# Patient Record
Sex: MALE | Race: WHITE | Employment: OTHER | URBAN - METROPOLITAN AREA
[De-identification: names, ages, dates, MRNs, and addresses within clinical notes are randomized per-mention and may not be internally consistent; named-entity substitution may affect disease eponyms.]

---

## 2023-01-03 ENCOUNTER — APPOINTMENT (OUTPATIENT)
Dept: GENERAL RADIOLOGY | Age: 73
End: 2023-01-03
Payer: MEDICARE

## 2023-01-03 ENCOUNTER — HOSPITAL ENCOUNTER (INPATIENT)
Age: 73
LOS: 3 days | Discharge: HOME | End: 2023-01-06
Attending: HOSPITALIST | Admitting: HOSPITALIST
Payer: MEDICARE

## 2023-01-03 DIAGNOSIS — J44.1 COPD EXACERBATION (HCC): ICD-10-CM

## 2023-01-03 DIAGNOSIS — R09.02 HYPOXEMIA REQUIRING SUPPLEMENTAL OXYGEN: ICD-10-CM

## 2023-01-03 DIAGNOSIS — J12.82 PNEUMONIA DUE TO COVID-19 VIRUS: Primary | ICD-10-CM

## 2023-01-03 DIAGNOSIS — Z99.81 HYPOXEMIA REQUIRING SUPPLEMENTAL OXYGEN: ICD-10-CM

## 2023-01-03 DIAGNOSIS — U07.1 PNEUMONIA DUE TO COVID-19 VIRUS: Primary | ICD-10-CM

## 2023-01-03 DIAGNOSIS — E83.42 HYPOMAGNESEMIA: ICD-10-CM

## 2023-01-03 PROBLEM — Z86.73 HISTORY OF CVA IN ADULTHOOD: Status: ACTIVE | Noted: 2023-01-03

## 2023-01-03 PROBLEM — I50.22 CHRONIC SYSTOLIC CHF (CONGESTIVE HEART FAILURE) (HCC): Status: ACTIVE | Noted: 2023-01-03

## 2023-01-03 PROBLEM — E78.5 HLD (HYPERLIPIDEMIA): Status: ACTIVE | Noted: 2023-01-03

## 2023-01-03 PROBLEM — J96.01 ACUTE HYPOXEMIC RESPIRATORY FAILURE DUE TO COVID-19 (HCC): Status: ACTIVE | Noted: 2023-01-03

## 2023-01-03 PROBLEM — J44.9 COPD (CHRONIC OBSTRUCTIVE PULMONARY DISEASE) (HCC): Status: ACTIVE | Noted: 2023-01-03

## 2023-01-03 LAB
A/G RATIO: 0.8 (ref 1.1–2.2)
ALBUMIN SERPL-MCNC: 3.1 G/DL (ref 3.4–5)
ALP BLD-CCNC: 189 U/L (ref 40–129)
ALT SERPL-CCNC: 30 U/L (ref 10–40)
ANION GAP SERPL CALCULATED.3IONS-SCNC: 10 MMOL/L (ref 3–16)
APTT: 33.9 SEC (ref 23–34.3)
AST SERPL-CCNC: 36 U/L (ref 15–37)
BASOPHILS ABSOLUTE: 0.1 K/UL (ref 0–0.2)
BASOPHILS RELATIVE PERCENT: 1.1 %
BILIRUB SERPL-MCNC: 0.5 MG/DL (ref 0–1)
BUN BLDV-MCNC: 13 MG/DL (ref 7–20)
CALCIUM SERPL-MCNC: 9.4 MG/DL (ref 8.3–10.6)
CHLORIDE BLD-SCNC: 91 MMOL/L (ref 99–110)
CO2: 32 MMOL/L (ref 21–32)
CREAT SERPL-MCNC: 0.7 MG/DL (ref 0.8–1.3)
EKG ATRIAL RATE: 100 BPM
EKG DIAGNOSIS: NORMAL
EKG P AXIS: 69 DEGREES
EKG P-R INTERVAL: 124 MS
EKG Q-T INTERVAL: 330 MS
EKG QRS DURATION: 102 MS
EKG QTC CALCULATION (BAZETT): 425 MS
EKG R AXIS: 74 DEGREES
EKG T AXIS: 47 DEGREES
EKG VENTRICULAR RATE: 100 BPM
EOSINOPHILS ABSOLUTE: 0.1 K/UL (ref 0–0.6)
EOSINOPHILS RELATIVE PERCENT: 0.6 %
FIBRINOGEN: 998 MG/DL (ref 207–509)
GFR SERPL CREATININE-BSD FRML MDRD: >60 ML/MIN/{1.73_M2}
GLUCOSE BLD-MCNC: 133 MG/DL (ref 70–99)
HCT VFR BLD CALC: 36.7 % (ref 40.5–52.5)
HEMOGLOBIN: 12.1 G/DL (ref 13.5–17.5)
INFLUENZA A: NOT DETECTED
INFLUENZA B: NOT DETECTED
INR BLD: 1.1 (ref 0.87–1.14)
LACTATE DEHYDROGENASE: 214 U/L (ref 100–190)
LACTIC ACID, SEPSIS: 1.5 MMOL/L (ref 0.4–1.9)
LYMPHOCYTES ABSOLUTE: 0.5 K/UL (ref 1–5.1)
LYMPHOCYTES RELATIVE PERCENT: 4.3 %
MAGNESIUM: 1.6 MG/DL (ref 1.8–2.4)
MCH RBC QN AUTO: 29.2 PG (ref 26–34)
MCHC RBC AUTO-ENTMCNC: 33.1 G/DL (ref 31–36)
MCV RBC AUTO: 88.1 FL (ref 80–100)
MONOCYTES ABSOLUTE: 1 K/UL (ref 0–1.3)
MONOCYTES RELATIVE PERCENT: 9.5 %
NEUTROPHILS ABSOLUTE: 9.1 K/UL (ref 1.7–7.7)
NEUTROPHILS RELATIVE PERCENT: 84.5 %
PDW BLD-RTO: 15.4 % (ref 12.4–15.4)
PLATELET # BLD: 353 K/UL (ref 135–450)
PMV BLD AUTO: 7.7 FL (ref 5–10.5)
POTASSIUM REFLEX MAGNESIUM: 3.4 MMOL/L (ref 3.5–5.1)
PRO-BNP: 170 PG/ML (ref 0–124)
PROCALCITONIN: 0.15 NG/ML (ref 0–0.15)
PROTHROMBIN TIME: 14.1 SEC (ref 11.7–14.5)
RBC # BLD: 4.16 M/UL (ref 4.2–5.9)
SARS-COV-2 RNA, RT PCR: DETECTED
SODIUM BLD-SCNC: 133 MMOL/L (ref 136–145)
TOTAL PROTEIN: 7 G/DL (ref 6.4–8.2)
TROPONIN: <0.01 NG/ML
TROPONIN: <0.01 NG/ML
WBC # BLD: 10.7 K/UL (ref 4–11)

## 2023-01-03 PROCEDURE — 6360000002 HC RX W HCPCS: Performed by: NURSE PRACTITIONER

## 2023-01-03 PROCEDURE — 71045 X-RAY EXAM CHEST 1 VIEW: CPT

## 2023-01-03 PROCEDURE — 96375 TX/PRO/DX INJ NEW DRUG ADDON: CPT

## 2023-01-03 PROCEDURE — 85384 FIBRINOGEN ACTIVITY: CPT

## 2023-01-03 PROCEDURE — 94640 AIRWAY INHALATION TREATMENT: CPT

## 2023-01-03 PROCEDURE — 1200000000 HC SEMI PRIVATE

## 2023-01-03 PROCEDURE — 84145 PROCALCITONIN (PCT): CPT

## 2023-01-03 PROCEDURE — 87636 SARSCOV2 & INF A&B AMP PRB: CPT

## 2023-01-03 PROCEDURE — 6370000000 HC RX 637 (ALT 250 FOR IP): Performed by: HOSPITALIST

## 2023-01-03 PROCEDURE — 87070 CULTURE OTHR SPECIMN AEROBIC: CPT

## 2023-01-03 PROCEDURE — 93005 ELECTROCARDIOGRAM TRACING: CPT | Performed by: STUDENT IN AN ORGANIZED HEALTH CARE EDUCATION/TRAINING PROGRAM

## 2023-01-03 PROCEDURE — 6370000000 HC RX 637 (ALT 250 FOR IP): Performed by: NURSE PRACTITIONER

## 2023-01-03 PROCEDURE — 96365 THER/PROPH/DIAG IV INF INIT: CPT

## 2023-01-03 PROCEDURE — 84484 ASSAY OF TROPONIN QUANT: CPT

## 2023-01-03 PROCEDURE — 87040 BLOOD CULTURE FOR BACTERIA: CPT

## 2023-01-03 PROCEDURE — 99285 EMERGENCY DEPT VISIT HI MDM: CPT

## 2023-01-03 PROCEDURE — 83930 ASSAY OF BLOOD OSMOLALITY: CPT

## 2023-01-03 PROCEDURE — 83615 LACTATE (LD) (LDH) ENZYME: CPT

## 2023-01-03 PROCEDURE — 83735 ASSAY OF MAGNESIUM: CPT

## 2023-01-03 PROCEDURE — 94761 N-INVAS EAR/PLS OXIMETRY MLT: CPT

## 2023-01-03 PROCEDURE — 85730 THROMBOPLASTIN TIME PARTIAL: CPT

## 2023-01-03 PROCEDURE — 93010 ELECTROCARDIOGRAM REPORT: CPT | Performed by: INTERNAL MEDICINE

## 2023-01-03 PROCEDURE — 2700000000 HC OXYGEN THERAPY PER DAY

## 2023-01-03 PROCEDURE — 82306 VITAMIN D 25 HYDROXY: CPT

## 2023-01-03 PROCEDURE — 80053 COMPREHEN METABOLIC PANEL: CPT

## 2023-01-03 PROCEDURE — 85610 PROTHROMBIN TIME: CPT

## 2023-01-03 PROCEDURE — 83605 ASSAY OF LACTIC ACID: CPT

## 2023-01-03 PROCEDURE — 85025 COMPLETE CBC W/AUTO DIFF WBC: CPT

## 2023-01-03 PROCEDURE — 87205 SMEAR GRAM STAIN: CPT

## 2023-01-03 PROCEDURE — 83880 ASSAY OF NATRIURETIC PEPTIDE: CPT

## 2023-01-03 RX ORDER — SODIUM CHLORIDE 9 MG/ML
INJECTION, SOLUTION INTRAVENOUS CONTINUOUS
Status: ACTIVE | OUTPATIENT
Start: 2023-01-03 | End: 2023-01-04

## 2023-01-03 RX ORDER — MAGNESIUM SULFATE 1 G/100ML
1000 INJECTION INTRAVENOUS ONCE
Status: COMPLETED | OUTPATIENT
Start: 2023-01-03 | End: 2023-01-03

## 2023-01-03 RX ORDER — UMECLIDINIUM 62.5 UG/1
AEROSOL, POWDER ORAL
COMMUNITY
Start: 2022-12-14

## 2023-01-03 RX ORDER — METHYLPREDNISOLONE SODIUM SUCCINATE 40 MG/ML
40 INJECTION, POWDER, LYOPHILIZED, FOR SOLUTION INTRAMUSCULAR; INTRAVENOUS EVERY 12 HOURS
Status: DISCONTINUED | OUTPATIENT
Start: 2023-01-04 | End: 2023-01-05

## 2023-01-03 RX ORDER — FLUTICASONE PROPIONATE AND SALMETEROL 50; 500 UG/1; UG/1
POWDER RESPIRATORY (INHALATION)
COMMUNITY
Start: 2023-01-02

## 2023-01-03 RX ORDER — ACETAMINOPHEN 650 MG/1
650 SUPPOSITORY RECTAL EVERY 6 HOURS PRN
Status: DISCONTINUED | OUTPATIENT
Start: 2023-01-03 | End: 2023-01-06 | Stop reason: HOSPADM

## 2023-01-03 RX ORDER — GUAIFENESIN/DEXTROMETHORPHAN 100-10MG/5
5 SYRUP ORAL EVERY 4 HOURS PRN
Status: DISCONTINUED | OUTPATIENT
Start: 2023-01-03 | End: 2023-01-06 | Stop reason: HOSPADM

## 2023-01-03 RX ORDER — METOPROLOL SUCCINATE 25 MG/1
TABLET, EXTENDED RELEASE ORAL
COMMUNITY
Start: 2022-12-24

## 2023-01-03 RX ORDER — ENOXAPARIN SODIUM 100 MG/ML
30 INJECTION SUBCUTANEOUS 2 TIMES DAILY
Status: DISCONTINUED | OUTPATIENT
Start: 2023-01-04 | End: 2023-01-06 | Stop reason: HOSPADM

## 2023-01-03 RX ORDER — SODIUM CHLORIDE 0.9 % (FLUSH) 0.9 %
10 SYRINGE (ML) INJECTION PRN
Status: DISCONTINUED | OUTPATIENT
Start: 2023-01-03 | End: 2023-01-06 | Stop reason: HOSPADM

## 2023-01-03 RX ORDER — ONDANSETRON 2 MG/ML
4 INJECTION INTRAMUSCULAR; INTRAVENOUS EVERY 6 HOURS PRN
Status: DISCONTINUED | OUTPATIENT
Start: 2023-01-03 | End: 2023-01-06 | Stop reason: HOSPADM

## 2023-01-03 RX ORDER — IPRATROPIUM BROMIDE AND ALBUTEROL SULFATE 2.5; .5 MG/3ML; MG/3ML
1 SOLUTION RESPIRATORY (INHALATION) ONCE
Status: COMPLETED | OUTPATIENT
Start: 2023-01-03 | End: 2023-01-03

## 2023-01-03 RX ORDER — MAGNESIUM SULFATE IN WATER 40 MG/ML
2000 INJECTION, SOLUTION INTRAVENOUS PRN
Status: DISCONTINUED | OUTPATIENT
Start: 2023-01-03 | End: 2023-01-06 | Stop reason: HOSPADM

## 2023-01-03 RX ORDER — METHYLPREDNISOLONE SODIUM SUCCINATE 125 MG/2ML
80 INJECTION, POWDER, LYOPHILIZED, FOR SOLUTION INTRAMUSCULAR; INTRAVENOUS ONCE
Status: COMPLETED | OUTPATIENT
Start: 2023-01-03 | End: 2023-01-03

## 2023-01-03 RX ORDER — ACETAMINOPHEN 325 MG/1
650 TABLET ORAL EVERY 6 HOURS PRN
Status: DISCONTINUED | OUTPATIENT
Start: 2023-01-03 | End: 2023-01-04 | Stop reason: SDUPTHER

## 2023-01-03 RX ORDER — OMEPRAZOLE 20 MG/1
CAPSULE, DELAYED RELEASE ORAL
COMMUNITY
Start: 2022-12-21

## 2023-01-03 RX ORDER — SPIRONOLACTONE 25 MG/1
TABLET ORAL
COMMUNITY
Start: 2022-12-24

## 2023-01-03 RX ORDER — DIGOXIN 125 MCG
125 TABLET ORAL DAILY
Status: DISCONTINUED | OUTPATIENT
Start: 2023-01-03 | End: 2023-01-06 | Stop reason: HOSPADM

## 2023-01-03 RX ORDER — ACETAMINOPHEN 325 MG/1
650 TABLET ORAL EVERY 6 HOURS PRN
Status: DISCONTINUED | OUTPATIENT
Start: 2023-01-03 | End: 2023-01-06 | Stop reason: HOSPADM

## 2023-01-03 RX ORDER — METOPROLOL SUCCINATE 25 MG/1
12.5 TABLET, EXTENDED RELEASE ORAL DAILY
Status: DISCONTINUED | OUTPATIENT
Start: 2023-01-04 | End: 2023-01-06 | Stop reason: HOSPADM

## 2023-01-03 RX ORDER — ATORVASTATIN CALCIUM 80 MG/1
TABLET, FILM COATED ORAL
COMMUNITY
Start: 2022-12-19

## 2023-01-03 RX ORDER — ONDANSETRON 4 MG/1
4 TABLET, ORALLY DISINTEGRATING ORAL EVERY 8 HOURS PRN
Status: DISCONTINUED | OUTPATIENT
Start: 2023-01-03 | End: 2023-01-06 | Stop reason: HOSPADM

## 2023-01-03 RX ORDER — ATORVASTATIN CALCIUM 40 MG/1
40 TABLET, FILM COATED ORAL NIGHTLY
Status: DISCONTINUED | OUTPATIENT
Start: 2023-01-03 | End: 2023-01-06 | Stop reason: HOSPADM

## 2023-01-03 RX ORDER — PANTOPRAZOLE SODIUM 40 MG/1
40 TABLET, DELAYED RELEASE ORAL
Status: DISCONTINUED | OUTPATIENT
Start: 2023-01-04 | End: 2023-01-06 | Stop reason: HOSPADM

## 2023-01-03 RX ORDER — DIGOXIN 125 MCG
TABLET ORAL
COMMUNITY
Start: 2022-12-19

## 2023-01-03 RX ORDER — ACETAMINOPHEN 650 MG/1
650 SUPPOSITORY RECTAL EVERY 6 HOURS PRN
Status: DISCONTINUED | OUTPATIENT
Start: 2023-01-03 | End: 2023-01-04 | Stop reason: SDUPTHER

## 2023-01-03 RX ORDER — TORSEMIDE 20 MG/1
TABLET ORAL
COMMUNITY
Start: 2022-12-19

## 2023-01-03 RX ORDER — POTASSIUM CHLORIDE 20 MEQ/1
20 TABLET, EXTENDED RELEASE ORAL ONCE
Status: COMPLETED | OUTPATIENT
Start: 2023-01-03 | End: 2023-01-04

## 2023-01-03 RX ORDER — POTASSIUM CHLORIDE 7.45 MG/ML
10 INJECTION INTRAVENOUS PRN
Status: DISCONTINUED | OUTPATIENT
Start: 2023-01-03 | End: 2023-01-06 | Stop reason: HOSPADM

## 2023-01-03 RX ORDER — SPIRONOLACTONE 25 MG/1
12.5 TABLET ORAL DAILY
Status: DISCONTINUED | OUTPATIENT
Start: 2023-01-04 | End: 2023-01-06 | Stop reason: HOSPADM

## 2023-01-03 RX ORDER — SODIUM CHLORIDE 9 MG/ML
INJECTION, SOLUTION INTRAVENOUS PRN
Status: DISCONTINUED | OUTPATIENT
Start: 2023-01-03 | End: 2023-01-06 | Stop reason: HOSPADM

## 2023-01-03 RX ORDER — SENNA PLUS 8.6 MG/1
1 TABLET ORAL DAILY PRN
Status: DISCONTINUED | OUTPATIENT
Start: 2023-01-03 | End: 2023-01-06 | Stop reason: HOSPADM

## 2023-01-03 RX ORDER — SODIUM CHLORIDE 0.9 % (FLUSH) 0.9 %
10 SYRINGE (ML) INJECTION EVERY 12 HOURS SCHEDULED
Status: DISCONTINUED | OUTPATIENT
Start: 2023-01-03 | End: 2023-01-06 | Stop reason: HOSPADM

## 2023-01-03 RX ORDER — MAGNESIUM SULFATE 1 G/100ML
1000 INJECTION INTRAVENOUS ONCE
Status: COMPLETED | OUTPATIENT
Start: 2023-01-03 | End: 2023-01-04

## 2023-01-03 RX ORDER — POTASSIUM CHLORIDE 20 MEQ/1
40 TABLET, EXTENDED RELEASE ORAL PRN
Status: DISCONTINUED | OUTPATIENT
Start: 2023-01-03 | End: 2023-01-06 | Stop reason: HOSPADM

## 2023-01-03 RX ORDER — TORSEMIDE 20 MG/1
20 TABLET ORAL DAILY
Status: DISCONTINUED | OUTPATIENT
Start: 2023-01-04 | End: 2023-01-06 | Stop reason: HOSPADM

## 2023-01-03 RX ADMIN — IPRATROPIUM BROMIDE AND ALBUTEROL SULFATE 1 AMPULE: .5; 2.5 SOLUTION RESPIRATORY (INHALATION) at 11:06

## 2023-01-03 RX ADMIN — DIGOXIN 125 MCG: 125 TABLET ORAL at 21:20

## 2023-01-03 RX ADMIN — MAGNESIUM SULFATE HEPTAHYDRATE 1000 MG: 1 INJECTION, SOLUTION INTRAVENOUS at 15:01

## 2023-01-03 RX ADMIN — Medication 2 PUFF: at 21:46

## 2023-01-03 RX ADMIN — ATORVASTATIN CALCIUM 40 MG: 40 TABLET, FILM COATED ORAL at 21:20

## 2023-01-03 RX ADMIN — METHYLPREDNISOLONE SODIUM SUCCINATE 80 MG: 125 INJECTION, POWDER, FOR SOLUTION INTRAMUSCULAR; INTRAVENOUS at 10:27

## 2023-01-03 ASSESSMENT — ENCOUNTER SYMPTOMS
SHORTNESS OF BREATH: 1
WHEEZING: 0
COLOR CHANGE: 0
ABDOMINAL PAIN: 0
COUGH: 0
NAUSEA: 0
VOMITING: 0
BACK PAIN: 0
DIARRHEA: 0

## 2023-01-03 NOTE — ED PROVIDER NOTES
**ADVANCED PRACTICE PROVIDER, I HAVE EVALUATED THIS [facility]  ED  EMERGENCY DEPARTMENT ENCOUNTER      Pt Name: Aarti Lynch  DSU:[id number]  [place] 1950  Date of evaluation: 1/3/2023  Provider: ISRAEL Dean CNP  Note Started: 10:03 AM EST 1/3/2023        Chief Complaint:    Chief Complaint   Patient presents with    Shortness of Breath     Started yesterday, hx of copd, 86% on RA, 91% on 2L NC         Nursing Notes, Past Medical Hx, Past Surgical Hx, Social Hx, Allergies, and Family Hx were all reviewed and agreed with or any disagreements were addressed in the HPI.    HPI: (Location, Duration, Timing, Severity, Quality, Assoc Sx, Context, Modifying factors)    History From: patient      Chief Complaint of shortness of breath on O2 at assisted living    This is a  81 y.o. male who presents  today with SOB, but started with a fever yesterday with tmax of 101, he states that he lives at [facility], has history of COPD and wears O2 at 3lpm/nc normally, he states that when he went to the bathroom earlier he was so SOB he felt like he was going to pass out, so he checked his pulse ox (as he wears an oximetry on a necklace) and it was 78-82% with his o2 in place. He denies any chest pain, pleuritic chest pain associated with his SOB. He states that he has had wet cough since yesterday, able to have a little bit of phlegm that is a greenish color. He is not a smoker. He is not have any pain on exam, no nausea vomiting or diarrhea. No headache neck pain neck stiffness, no pain on exam, no additional complaints, no additional aggravating relieving factors. Patient presents awake, alert and in no acute respiratory distress or toxic appearance.     PastMedical/Surgical History:      Diagnosis Date    Cerebral artery occlusion with cerebral infarction [facility])     COPD (chronic obstructive pulmonary disease) (HCC)     Hyperlipidemia      History reviewed. No pertinent surgical history. Medications:  Previous Medications    ADVAIR DISKUS 500-50 MCG/ACT AEPB DISKUS INHALER        ATORVASTATIN (LIPITOR) 80 MG TABLET        DIGOXIN (LANOXIN) 125 MCG TABLET        INCRUSE ELLIPTA 62.5 MCG/ACT AEPB        METOPROLOL SUCCINATE (TOPROL XL) 25 MG EXTENDED RELEASE TABLET        OMEPRAZOLE (PRILOSEC) 20 MG DELAYED RELEASE CAPSULE        SPIRONOLACTONE (ALDACTONE) 25 MG TABLET        TORSEMIDE (DEMADEX) 20 MG TABLET           Review of Systems:  (1 systems needed)  Review of Systems   Constitutional:  Negative for chills and fever. HENT:  Negative for congestion. Respiratory:  Positive for shortness of breath. Negative for cough and wheezing. Patient presents with increased shortness of breath, states that he started with a fever yesterday, when he is exerting himself at his facility where he lives in assisted living he was just short of breath he checked his pulse ox and he was 78% on his 3 L nasal cannula that he wears continuously. Cardiovascular:  Negative for chest pain. Gastrointestinal:  Negative for abdominal pain, diarrhea, nausea and vomiting. Genitourinary:  Negative for difficulty urinating, dysuria, frequency and hematuria. Musculoskeletal:  Negative for back pain. Skin:  Negative for color change. Neurological:  Negative for weakness, numbness and headaches. \"Positives and Pertinent negatives as per HPI\"    Physical Exam:  Physical Exam  Vitals and nursing note reviewed. Constitutional:       Appearance: He is well-developed. He is not diaphoretic. HENT:      Head: Normocephalic. Right Ear: External ear normal.      Left Ear: External ear normal.   Eyes:      General:         Right eye: No discharge. Left eye: No discharge. Cardiovascular:      Rate and Rhythm: Normal rate. Comments: Patient has normal S1 and 2, peripheral pulses are 2+, no obvious edema noted.   Pulmonary:      Effort: Pulmonary effort is normal. No respiratory distress. Breath sounds: Wheezing present. Comments: Airway patent with symmetric rise and fall of chest, lungs are clear anteriorly, posteriorly he has expiratory wheezing, he is not tachypneic or dyspneic during conversation his pulse ox is 91 to 92% on his 3 L of nasal cannula  Abdominal:      Palpations: Abdomen is soft. Musculoskeletal:         General: Normal range of motion. Cervical back: Normal range of motion and neck supple. Skin:     General: Skin is warm. Capillary Refill: Capillary refill takes less than 2 seconds. Coloration: Skin is not pale. Neurological:      General: No focal deficit present. Mental Status: He is alert and oriented to person, place, and time. GCS: GCS eye subscore is 4. GCS verbal subscore is 5. GCS motor subscore is 6.       Comments: Patient is awake, alert, following commands correctly, neurologic intact no focal deficits   Psychiatric:         Behavior: Behavior normal.       MEDICAL DECISION MAKING    Vitals:    Vitals:    01/03/23 0853 01/03/23 1027 01/03/23 1107 01/03/23 1304   BP: 123/79 129/73  132/80   Pulse: (!) 106 92 94 97   Resp: 16 21 19 18   Temp: 98.5 °F (36.9 °C)      TempSrc: Oral      SpO2: 91% 92% 93% 90%   Weight: 134 lb (60.8 kg)      Height: 5' 11.75\" (1.822 m)          LABS:  Labs Reviewed   COVID-19 & INFLUENZA COMBO - Abnormal; Notable for the following components:       Result Value    SARS-CoV-2 RNA, RT PCR DETECTED (*)     All other components within normal limits   CBC WITH AUTO DIFFERENTIAL - Abnormal; Notable for the following components:    RBC 4.16 (*)     Hemoglobin 12.1 (*)     Hematocrit 36.7 (*)     Neutrophils Absolute 9.1 (*)     Lymphocytes Absolute 0.5 (*)     All other components within normal limits   COMPREHENSIVE METABOLIC PANEL W/ REFLEX TO MG FOR LOW K - Abnormal; Notable for the following components:    Sodium 133 (*)     Potassium reflex Magnesium 3.4 (*) Chloride 91 (*)     Glucose 133 (*)     Creatinine 0.7 (*)     Albumin 3.1 (*)     Albumin/Globulin Ratio 0.8 (*)     Alkaline Phosphatase 189 (*)     All other components within normal limits   BRAIN NATRIURETIC PEPTIDE - Abnormal; Notable for the following components:    Pro- (*)     All other components within normal limits   MAGNESIUM - Abnormal; Notable for the following components:    Magnesium 1.60 (*)     All other components within normal limits   CULTURE, BLOOD 1   CULTURE, BLOOD 2   TROPONIN   LACTATE, SEPSIS   LACTATE, SEPSIS   PROCALCITONIN        Remainder of labs reviewed and were negative at this time or not returned at the time of this note. RADIOLOGY:   Non-plain film images such as CT, Ultrasound and MRI are read by the radiologist. Meredith CARRILLO, APRN - CNP have directly visualized the radiologic plain film image(s) with the below findings:      Interpretation per the Radiologist below, if available at the time of this note:    XR CHEST PORTABLE   Final Result   Multifocal airspace opacities most prevalent in the right lower lobe and   small right pleural effusion. No prior exams are available for comparison. In the acute setting this could represent multifocal pneumonia. Recommend   follow-up x-rays until resolution. MEDICAL DECISION MAKING / ED COURSE:    Because of high probability of sudden clinical deterioration of the patient's condition and risk of further deterioration, critical care time required my full attention to the patient's condition; which included chart data review, documentation, medication ordering, reviewing the patient's old records, reevaluation patient's cardiac, pulmonary and neurological status. Reevaluation of vital signs. Consultations with ED attending and admitting physician. Ordering, interpreting reviewing diagnostic testing.   Therefore, I personally saw the patient and independently provided 32 minutes of non-concurrent critical care out of the total shared critical care time provided, direct attention to the patient's condition did not include time spent on procedures. PROCEDURES:   Procedures    None    Patient was given:  Medications   magnesium sulfate 1000 mg in dextrose 5% 100 mL IVPB (has no administration in time range)   methylPREDNISolone sodium (SOLU-MEDROL) injection 80 mg (80 mg IntraVENous Given 1/3/23 1027)   ipratropium-albuterol (DUONEB) nebulizer solution 1 ampule (1 ampule Inhalation Given 1/3/23 1106)       CONSULTS: (Who and What was discussed)  None        Chronic Conditions affecting care:    has a past medical history of Cerebral artery occlusion with cerebral infarction ([facility] [address] COPD (chronic obstructive pulmonary disease) ([facility] [address] and Hyperlipidemia. Patient presents with increased shortness of breath, states that he started with a fever yesterday, when he is exerting himself at his facility where he lives in assisted living he was just short of breath he checked his pulse ox and he was 78% on his 3 L nasal cannula that he wears continuously. After evaluation and examination the patient IV access, blood work, chest x-ray, EKG, DuoNeb nebulizer and Solu-Medrol were ordered. I also ordered COVID and flu swabs. Patient is COVID-positive. CBC shows no acute sepsis or anemia. Metabolic panel shows no significant electrolyte disturbances or renal failure. His potassium is 3.4, reflex magnesium is 1.6, patient was ordered IV magnesium. Troponin is negative. BNP is 170. Influenza is negative. Chest x-ray shows multifocal airspace opacity concerning of right lower lobe pleural effusion, possibly patient having multifocal pneumonia however, I do believe this is more related to COVID-19 as he does not have a white count, he is not febrile today. EKG is sinus tach rate of 100 bpm, no acute ST elevation, please see attending physician documentation for EKG interpretation note.   However due to the COVID-19 I added on lactic acid and Pro-Darwin both were negative, patient ambulated with nursing staff and pulse ox is dropping in the mid 80s with his O2 in place, I do have concerns for hypoxia if I send him back to the facility. I spoke with the patient about being admitted to the hospital, he agrees with this plan. I paged hospitalist on-call via [employer] for admission. Therefore, shared medical decision was made between the patient and myself and we agreed patient could would be admitted to the hospital for further evaluation management of care under hospitalist services. The patient tolerated their visit well. I evaluated the patient. The physician was available for consultation as needed. The patient and / or the family were informed of the results of any tests, a time was given to answer questions, a plan was proposed and they agreed with plan. I am the Primary Clinician of Record. CLINICAL IMPRESSION:  1. Pneumonia due to COVID-19 virus    2. COPD exacerbation ([facility] [address]    3. Hypoxemia requiring supplemental oxygen        DISPOSITION Decision To Admit 01/03/2023 02:36:41 PM      PATIENT REFERRED TO:  No follow-up provider specified.     DISCHARGE MEDICATIONS:  New Prescriptions    No medications on file       DISCONTINUED MEDICATIONS:  Discontinued Medications    No medications on file              (Please note the MDM and HPI sections of this note were completed with a voice recognition program.  Efforts were made to edit the dictations but occasionally words are mis-transcribed.)    Electronically signed, ISRAEL Brito CNP,          ISRAEL Brito CNP  01/03/23 0260

## 2023-01-03 NOTE — ED NOTES
Ambulated patient approx. 25 ft. O2 dropped to 88% pulse 110.      Jose [facility]  01/03/23 2698

## 2023-01-04 LAB
A/G RATIO: 0.7 (ref 1.1–2.2)
ALBUMIN SERPL-MCNC: 2.9 G/DL (ref 3.4–5)
ALP BLD-CCNC: 165 U/L (ref 40–129)
ALT SERPL-CCNC: 31 U/L (ref 10–40)
ANION GAP SERPL CALCULATED.3IONS-SCNC: 10 MMOL/L (ref 3–16)
AST SERPL-CCNC: 36 U/L (ref 15–37)
BASOPHILS ABSOLUTE: 0 K/UL (ref 0–0.2)
BASOPHILS RELATIVE PERCENT: 0.4 %
BILIRUB SERPL-MCNC: 0.3 MG/DL (ref 0–1)
BILIRUBIN URINE: NEGATIVE
BLOOD, URINE: NEGATIVE
BUN BLDV-MCNC: 25 MG/DL (ref 7–20)
CALCIUM SERPL-MCNC: 9.4 MG/DL (ref 8.3–10.6)
CHLORIDE BLD-SCNC: 94 MMOL/L (ref 99–110)
CLARITY: CLEAR
CO2: 31 MMOL/L (ref 21–32)
COLOR: YELLOW
CREAT SERPL-MCNC: 0.7 MG/DL (ref 0.8–1.3)
DIGOXIN LEVEL: 1 NG/ML (ref 0.8–2)
EOSINOPHILS ABSOLUTE: 0 K/UL (ref 0–0.6)
EOSINOPHILS RELATIVE PERCENT: 0 %
GFR SERPL CREATININE-BSD FRML MDRD: >60 ML/MIN/{1.73_M2}
GLUCOSE BLD-MCNC: 177 MG/DL (ref 70–99)
GLUCOSE URINE: NEGATIVE MG/DL
HCT VFR BLD CALC: 35.4 % (ref 40.5–52.5)
HEMOGLOBIN: 11.5 G/DL (ref 13.5–17.5)
KETONES, URINE: NEGATIVE MG/DL
LACTIC ACID: 1.3 MMOL/L (ref 0.4–2)
LACTIC ACID: 2 MMOL/L (ref 0.4–2)
LACTIC ACID: 2.1 MMOL/L (ref 0.4–2)
LACTIC ACID: 3.4 MMOL/L (ref 0.4–2)
LEUKOCYTE ESTERASE, URINE: NEGATIVE
LYMPHOCYTES ABSOLUTE: 0.4 K/UL (ref 1–5.1)
LYMPHOCYTES RELATIVE PERCENT: 4.1 %
MCH RBC QN AUTO: 28.4 PG (ref 26–34)
MCHC RBC AUTO-ENTMCNC: 32.4 G/DL (ref 31–36)
MCV RBC AUTO: 87.9 FL (ref 80–100)
MICROSCOPIC EXAMINATION: YES
MONOCYTES ABSOLUTE: 0.3 K/UL (ref 0–1.3)
MONOCYTES RELATIVE PERCENT: 2.9 %
NEUTROPHILS ABSOLUTE: 8.9 K/UL (ref 1.7–7.7)
NEUTROPHILS RELATIVE PERCENT: 92.6 %
NITRITE, URINE: NEGATIVE
OSMOLALITY: 290 MOSM/KG (ref 280–301)
PDW BLD-RTO: 15.6 % (ref 12.4–15.4)
PH UA: 6 (ref 5–8)
PLATELET # BLD: 398 K/UL (ref 135–450)
PMV BLD AUTO: 8.3 FL (ref 5–10.5)
POTASSIUM REFLEX MAGNESIUM: 4.1 MMOL/L (ref 3.5–5.1)
PROTEIN UA: ABNORMAL MG/DL
RBC # BLD: 4.03 M/UL (ref 4.2–5.9)
RBC UA: NORMAL /HPF (ref 0–4)
SODIUM BLD-SCNC: 135 MMOL/L (ref 136–145)
SPECIFIC GRAVITY UA: 1.02 (ref 1–1.03)
TOTAL PROTEIN: 6.9 G/DL (ref 6.4–8.2)
TROPONIN: <0.01 NG/ML
TROPONIN: <0.01 NG/ML
URINE REFLEX TO CULTURE: ABNORMAL
URINE TYPE: ABNORMAL
UROBILINOGEN, URINE: 1 E.U./DL
VITAMIN D 25-HYDROXY: 40.8 NG/ML
VITAMIN D 25-HYDROXY: 44.4 NG/ML
WBC # BLD: 9.6 K/UL (ref 4–11)
WBC UA: NORMAL /HPF (ref 0–5)

## 2023-01-04 PROCEDURE — 85025 COMPLETE CBC W/AUTO DIFF WBC: CPT

## 2023-01-04 PROCEDURE — 36415 COLL VENOUS BLD VENIPUNCTURE: CPT

## 2023-01-04 PROCEDURE — 2580000003 HC RX 258: Performed by: HOSPITALIST

## 2023-01-04 PROCEDURE — 84484 ASSAY OF TROPONIN QUANT: CPT

## 2023-01-04 PROCEDURE — 6360000002 HC RX W HCPCS: Performed by: HOSPITALIST

## 2023-01-04 PROCEDURE — 94761 N-INVAS EAR/PLS OXIMETRY MLT: CPT

## 2023-01-04 PROCEDURE — 6370000000 HC RX 637 (ALT 250 FOR IP): Performed by: HOSPITALIST

## 2023-01-04 PROCEDURE — 80053 COMPREHEN METABOLIC PANEL: CPT

## 2023-01-04 PROCEDURE — 80162 ASSAY OF DIGOXIN TOTAL: CPT

## 2023-01-04 PROCEDURE — 94640 AIRWAY INHALATION TREATMENT: CPT

## 2023-01-04 PROCEDURE — 87040 BLOOD CULTURE FOR BACTERIA: CPT

## 2023-01-04 PROCEDURE — 82306 VITAMIN D 25 HYDROXY: CPT

## 2023-01-04 PROCEDURE — 81001 URINALYSIS AUTO W/SCOPE: CPT

## 2023-01-04 PROCEDURE — 2700000000 HC OXYGEN THERAPY PER DAY

## 2023-01-04 PROCEDURE — 83605 ASSAY OF LACTIC ACID: CPT

## 2023-01-04 PROCEDURE — 1200000000 HC SEMI PRIVATE

## 2023-01-04 RX ADMIN — Medication 2 PUFF: at 09:11

## 2023-01-04 RX ADMIN — PANTOPRAZOLE SODIUM 40 MG: 40 TABLET, DELAYED RELEASE ORAL at 07:14

## 2023-01-04 RX ADMIN — POTASSIUM CHLORIDE 20 MEQ: 1500 TABLET, EXTENDED RELEASE ORAL at 00:35

## 2023-01-04 RX ADMIN — SODIUM CHLORIDE: 9 INJECTION, SOLUTION INTRAVENOUS at 00:17

## 2023-01-04 RX ADMIN — METHYLPREDNISOLONE SODIUM SUCCINATE 40 MG: 40 INJECTION, POWDER, FOR SOLUTION INTRAMUSCULAR; INTRAVENOUS at 00:15

## 2023-01-04 RX ADMIN — DIGOXIN 125 MCG: 125 TABLET ORAL at 09:45

## 2023-01-04 RX ADMIN — GUAIFENESIN AND DEXTROMETHORPHAN 5 ML: 100; 10 SYRUP ORAL at 00:15

## 2023-01-04 RX ADMIN — ENOXAPARIN SODIUM 30 MG: 100 INJECTION SUBCUTANEOUS at 09:45

## 2023-01-04 RX ADMIN — SPIRONOLACTONE 12.5 MG: 25 TABLET ORAL at 09:45

## 2023-01-04 RX ADMIN — Medication 10 ML: at 21:25

## 2023-01-04 RX ADMIN — ATORVASTATIN CALCIUM 40 MG: 40 TABLET, FILM COATED ORAL at 21:25

## 2023-01-04 RX ADMIN — METOPROLOL SUCCINATE 12.5 MG: 25 TABLET, EXTENDED RELEASE ORAL at 09:45

## 2023-01-04 RX ADMIN — Medication 2 PUFF: at 19:38

## 2023-01-04 RX ADMIN — METHYLPREDNISOLONE SODIUM SUCCINATE 40 MG: 40 INJECTION, POWDER, FOR SOLUTION INTRAMUSCULAR; INTRAVENOUS at 12:29

## 2023-01-04 RX ADMIN — ENOXAPARIN SODIUM 30 MG: 100 INJECTION SUBCUTANEOUS at 21:25

## 2023-01-04 RX ADMIN — MAGNESIUM SULFATE HEPTAHYDRATE 1000 MG: 1 INJECTION, SOLUTION INTRAVENOUS at 00:35

## 2023-01-04 RX ADMIN — TIOTROPIUM BROMIDE INHALATION SPRAY 1 PUFF: 3.12 SPRAY, METERED RESPIRATORY (INHALATION) at 09:11

## 2023-01-04 RX ADMIN — TORSEMIDE 20 MG: 20 TABLET ORAL at 09:44

## 2023-01-04 NOTE — CARE COORDINATION
CM attempted to reach pt via hospital room phone. No answer. VM left for spouse. Per CR, as of Oct 2022, pt resides w/spouse in house. 1STE. IPTA. Cane and walker. Pt currently on 3 liters/NC oxygen. CM team will follow for needs. Pending call back from spouse.

## 2023-01-04 NOTE — CARE COORDINATION
CM attempted to reach patient via hospital room phone. No answer. Cm attempted to reach son, Opal Leon [phone]. No answer.  left for call back.

## 2023-01-04 NOTE — PROGRESS NOTES
Hospitalist Progress Note      PCP: No primary care provider on file. Date of Admission: 1/3/2023    Chief Complaint: SOB    Hospital Course:   Cheikh Holland is a 81 y.o. male who presented to the ED to be evaluated for acute on chronic dyspnea, in the setting of underlying COPD. Patient reports that he is typically well managed on continuous 2 L O2 per NC, but yesterday began experiencing exertional dyspnea well beyond his baseline. Home pulse oximeter demonstrated SPO2 of 86%, which is what prompted his transport to the ED. Patient reports that he has an underlying history of COPD but no longer smokes tobacco.  He is unaware of any recent sick exposures, but does admit that he lives in an assisted living facility. Patient reports that he is fully vaccinated against SARS-CoV-2 and influenza. Upon arrival to the ED EKG was obtained revealing NSR with low limb lead voltage, and nonspecific intraventricular conduction delay. CXR notable for multifocal airspace opacities, most prevalent in RLL; consistent with COVID multifocal pneumonia. SARS-CoV-2 testing performed and found to be positive; influenza A/B-. Notable labs include: Hyponatremia 133, hypomagnesemia 1.6, hyperglycemia 133,  fibrinogen 998, , and anemia with H/H12.1/36.7. Patient received one-time dosage of IV Solu-Medrol 80 mg as well as DuoNeb therapy per ED provider prior to request for admission.     Subjective:  feels a little better this am. No events overnight      Medications:  Reviewed    Infusion Medications    sodium chloride       Scheduled Medications    enoxaparin  30 mg SubCUTAneous BID    methylPREDNISolone  40 mg IntraVENous Q12H    metoprolol succinate  12.5 mg Oral Daily    pantoprazole  40 mg Oral QAM AC    torsemide  20 mg Oral Daily    spironolactone  12.5 mg Oral Daily    sodium chloride flush  10 mL IntraVENous 2 times per day    mometasone-formoterol  2 puff Inhalation BID    atorvastatin  40 mg Oral Nightly    digoxin  125 mcg Oral Daily    tiotropium  1 puff Inhalation QAM     PRN Meds: acetaminophen **OR** acetaminophen, guaiFENesin-dextromethorphan, sodium chloride flush, sodium chloride, potassium chloride **OR** potassium alternative oral replacement **OR** potassium chloride, magnesium sulfate, senna, acetaminophen **OR** acetaminophen, ondansetron **OR** ondansetron    No intake or output data in the 24 hours ending 01/04/23 0727    Physical Exam Performed:    /84   Pulse 85   Temp 98.3 °F (36.8 °C) (Oral)   Resp 16   Ht 5' 11\" (1.803 m)   Wt 115 lb 4.8 oz (52.3 kg)   SpO2 93%   BMI 16.08 kg/m²     General appearance: frail. No apparent distress, appears stated age and cooperative. HEENT: Pupils equal, round, and reactive to light. Conjunctivae/corneas clear. Neck: Supple, with full range of motion. No jugular venous distention. Trachea midline. Respiratory:  Normal respiratory effort. Diminished bases. Cardiovascular: Regular rate and rhythm with normal S1/S2 without murmurs, rubs or gallops. Abdomen: Soft, non-tender, non-distended with normal bowel sounds. Musculoskeletal: No clubbing, cyanosis or edema bilaterally. Full range of motion without deformity. Skin: Skin color, texture, turgor normal.  No rashes or lesions. Neurologic:  Neurovascularly intact without any focal sensory/motor deficits.  Cranial nerves: II-XII intact, grossly non-focal.  Psychiatric: Alert and oriented, thought content appropriate, normal insight  Capillary Refill: Brisk, 3 seconds, normal   Peripheral Pulses: +2 palpable, equal bilaterally       Labs:   Recent Labs     01/03/23  1019 01/04/23  0555   WBC 10.7 9.6   HGB 12.1* 11.5*   HCT 36.7* 35.4*    398     Recent Labs     01/03/23  1019 01/04/23  0555   * 135*   K 3.4* 4.1   CL 91* 94*   CO2 32 31   BUN 13 25*   CREATININE 0.7* 0.7*   CALCIUM 9.4 9.4     Recent Labs     01/03/23  1019 01/04/23  0555   AST 36 36   ALT 30 31   BILITOT 0.5 0. 3   ALKPHOS 189* 165*     Recent Labs     01/03/23 2008   INR 1.10     Recent Labs     01/03/23  1019 01/03/23 2008 01/04/23  0140   TROPONINI <0.01 <0.01 <0.01       Urinalysis:    No results found for: Davey Rossi, BACTERIA, RBCUA, BLOODU, SPECGRAV, GLUCOSEU    Radiology:  XR CHEST PORTABLE   Final Result   Multifocal airspace opacities most prevalent in the right lower lobe and   small right pleural effusion. No prior exams are available for comparison. In the acute setting this could represent multifocal pneumonia. Recommend   follow-up x-rays until resolution.              None    Assessment/Plan:    Active Hospital Problems    Diagnosis     Acute hypoxemic respiratory failure due to COVID-19 ([facility]) [U07.1, J96.01]      Priority: Medium    COPD (chronic obstructive pulmonary disease) ([facility] [address] [J44.9]      Priority: Medium    Chronic systolic CHF (congestive heart failure) ([facility]) [I50.22]      Priority: Medium    HLD (hyperlipidemia) [E78.5]      Priority: Medium    History of CVA in adulthood [Z86.73]      Priority: Medium     COVID-19 with acute hypoxic respiratory failure  -Admit to floor for continuous telemetry and SPO2 monitoring; droplet plus isolation precautions in place  -PCT 0.15, LA 3.4 today  -On 3lpm n/c  -Albuterol MDI scheduled every 4 hours as needed; continue home regimen Advair Diskus  -IV Solu-Medrol Q12H initiated; POC glucose with PRN insulin coverage scheduled  -Lovenox 30 SC BID initiated per current treatment guidelines     Chronic systolic CHF  -Patient admitted to telemetry floor for continuous monitoring during stay  -EKG obtained in ED reviewed: low voltage QRS and nonspecific intraventricular conduction delay  -Continue home medication dosage of digoxin (level pending), Toprol-XL, Aldactone, and Demadex; strict I's and O's during stay  -ECHO scheduled in a.m. to further assess cardiac structure and function      Hypokalemia/hypomagnesemia  -Hypokalemia/hypomagnesemia algorithm in place to ensure adequate repletion of electrolytes     Mild hyperglycemia  -A1c ordered  -Patient on no home hypoglycemic agents PTA  -PRN Humalog medium dose SSI scheduled before meals and at bedtime based on POC glucose  -Carbohydrate restriction placed on diet while he requires parenteral steroids     DVT Prophylaxis: Lovenox  Diet: ADULT DIET; Regular; 5 carb choices (75 gm/meal);  Low Fat/Low Chol/High Fiber/2 gm Na; 1800 ml  Code Status: Limited  PT/OT Eval Status: consulted    Dispo - 2 days    Appropriate for A1 Discharge Unit: No      Ocie Orts, APRN - CNP

## 2023-01-04 NOTE — PROGRESS NOTES
Pt came tot he floor _ [phone] from ER, a/ox4, vss, with sob and and a hacking cough. Pt is currently on 3LNSC- also  his baseline. Currently on 4LNC,sat @ 94 percent. Skin is intact, IVF is running mag+ is completed, labs modified to be collected. Bed in lowest level with call light in reach.

## 2023-01-04 NOTE — H&P
HOSPITALISTS HISTORY AND PHYSICAL    1/3/2023 8:02 PM    Patient Information:  ROMINA PADILLA is a 72 y.o. male [id number]  PCP:  No primary care provider on file. (Tel: None )    Chief complaint:    Chief Complaint   Patient presents with    Shortness of Breath     Started yesterday, hx of copd, 86% on RA, 91% on 2L NC        History of Present Illness:  Romina Padilla is a 72 y.o. male who presented to the ED to be evaluated for acute on chronic dyspnea, in the setting of underlying COPD.  Patient reports that he is typically well managed on continuous 2 L O2 per NC, but yesterday began experiencing exertional dyspnea well beyond his baseline.  Home pulse oximeter demonstrated SPO2 of 86%, which is what prompted his transport to the ED.  Patient reports that he has an underlying history of COPD but no longer smokes tobacco.  He is unaware of any recent sick exposures, but does admit that he lives in an assisted living facility.  Patient reports that he is fully vaccinated against SARS-CoV-2 and influenza.    Upon arrival to the ED EKG was obtained revealing NSR with low limb lead voltage, and nonspecific intraventricular conduction delay.  CXR notable for multifocal airspace opacities, most prevalent in RLL; consistent with COVID multifocal pneumonia.  SARS-CoV-2 testing performed and found to be positive; influenza A/B-.  Notable labs include: Hyponatremia 133, hypomagnesemia 1.6, hyperglycemia 133,  fibrinogen 998, , and anemia with H/H12.1/36.7.  Patient received one-time dosage of IV Solu-Medrol 80 mg as well as DuoNeb therapy per ED provider prior to request for admission.    History obtained from patient and review of [facility] chart    REVIEW OF SYSTEMS:   Constitutional: Positive for fever,chills, and generalized weakness  ENT: Negative for headache, rhinorrhea, and sore throat.  Respiratory: Acute on  chronic dyspnea with productive cough; underlying COPD with chronic O2 requirement  Cardiovascular: Negative for chest pain, palpitations, peripheral edema, orthopnea or PND  Gastrointestinal: Negative for N/V/D and abdominal pain; no hematemesis, hematochezia, or melena; positive anorexia  Genitourinary: Negative for dysuria, frequency, retention; no incontinence  Hematologic/Lymphatic: Negative for bleeding tendency/excessive bruising  Musculoskeletal: Positive acute myalgias and arthalgias; able to ambulate without difficulty  Neurologic: Negative for LOC, seizure activity, paresthesias, dysarthria, vertigo, and gait disturbance  Skin: Negative for itching,rash, decubitus  Psychiatric: Negative for depression,anxiety, and agitation; no hallucinations; denies SI/HI  Endocrine: Negative for polyuria/polydipsia/polyphagia; no heat/cold intolerance    Past Medical History:   has a past medical history of Cerebral artery occlusion with cerebral infarction ([facility] [address] COPD (chronic obstructive pulmonary disease) ([facility] [address] and Hyperlipidemia. Past Surgical History:   has no past surgical history on file. Medications:  No current facility-administered medications on file prior to encounter. Current Outpatient Medications on File Prior to Encounter   Medication Sig Dispense Refill    atorvastatin (LIPITOR) 80 MG tablet       digoxin (LANOXIN) 125 MCG tablet       ADVAIR DISKUS 500-50 MCG/ACT AEPB diskus inhaler       metoprolol succinate (TOPROL XL) 25 MG extended release tablet       omeprazole (PRILOSEC) 20 MG delayed release capsule       spironolactone (ALDACTONE) 25 MG tablet       torsemide (DEMADEX) 20 MG tablet       INCRUSE ELLIPTA 62.5 MCG/ACT AEPB          Allergies:  No Known Allergies     Social History:   reports that he has never smoked. He has never used smokeless tobacco.     Family History:  family history is not on file.      Physical Exam:  /78   Pulse 78   Temp 98.5 °F (36.9 °C) (Oral) Resp 19   Ht 5' 11.75\" (1.822 m)   Wt 134 lb (60.8 kg)   SpO2 94%   BMI 18.30 kg/m²     General appearance: Pleasant frail cachectic elderly male who appears acutely ill  Eyes: Sclera clear without conjunctival injection; PERRLA; EOMI  ENT: Mucous membranes moist without thrush; normal dentition  Neck: Supple without meningismus; no goiter; no carotid bruit bilaterally  Cardiovascular: Regular rhythm with ectopy; normal S1-S2 with no murmurs; no peripheral edema; no JVD  Respiratory: Moderate tachypnea; coarse RLL Rales without rhonchi or wheeze  Gastrointestinal: Abdomen soft, non-tender, not distended; bowel sounds normal; no masses/organomegaly appreciated  Musculoskeletal: FROM spine and extremities x4; no gross deformity  Neurology: A&O x3; cranial nerves 2-12 grossly intact; motor 5/5  BUE/BLE; no seizure activity  Psychiatry: Well-groomed with good eye contact; appropriate affect; no visual/auditory hallucination  Skin: Warm, dry, normal turgor, no rash  PV: 2/4 radial and dorsalis pedis bilaterally; brisk capillary refill    Labs:  CBC:   Lab Results   Component Value Date/Time    WBC 10.7 01/03/2023 10:19 AM    RBC 4.16 01/03/2023 10:19 AM    HGB 12.1 01/03/2023 10:19 AM    HCT 36.7 01/03/2023 10:19 AM    MCV 88.1 01/03/2023 10:19 AM    MCH 29.2 01/03/2023 10:19 AM    MCHC 33.1 01/03/2023 10:19 AM    RDW 15.4 01/03/2023 10:19 AM     01/03/2023 10:19 AM    MPV 7.7 01/03/2023 10:19 AM     BMP:    Lab Results   Component Value Date/Time     01/03/2023 10:19 AM    K 3.4 01/03/2023 10:19 AM    CL 91 01/03/2023 10:19 AM    CO2 32 01/03/2023 10:19 AM    BUN 13 01/03/2023 10:19 AM    CREATININE 0.7 01/03/2023 10:19 AM    CALCIUM 9.4 01/03/2023 10:19 AM    LABGLOM >60 01/03/2023 10:19 AM    GLUCOSE 133 01/03/2023 10:19 AM     XR CHEST PORTABLE   Final Result   Multifocal airspace opacities most prevalent in the right lower lobe and   small right pleural effusion.   No prior exams are available for comparison. In the acute setting this could represent multifocal pneumonia. Recommend   follow-up x-rays until resolution. EKG: Ventricular Rate 100 BPM QTc Calculation (Bazett) 425 ms   Atrial Rate 100 BPM P [place] 69 degrees   P-R Interval 124 ms R Axis 74 degrees   QRS Duration 102 ms T Axis 47 degrees   Q-T Interval 330 ms Diagnosis Normal sinus rhythmLow voltage in the limb leadsNon-specific intra-ventricular conduction delay     I visualized CXR images and EKG strips personally and agree with documented interpretation    Discussed case  with ED provider    Problem List:  Principal Problem:    Acute hypoxemic respiratory failure due to COVID-19 [facility])  Active Problems:    COPD (chronic obstructive pulmonary disease) (HCC)    Chronic systolic CHF (congestive heart failure) ([facility])    HLD (hyperlipidemia)    History of CVA in adulthood  Resolved Problems:    * No resolved hospital problems.  *        Consults:  None      Assessment/Plan:     COVID-19 with acute hypoxic respiratory failure  -Admit to floor for continuous telemetry and SPO2 monitoring; droplet plus isolation precautions in place  -Coags and anti-inflammatory markers collected with results currently pending  -Procalcitonin level obtained and noted to be normal without elevation, therefore antibiotics not initiated at this time  -PRN supplemental O2; incentive spirometry encouraged Q4H while awake  -Albuterol MDI scheduled every 4 hours as needed; continue home regimen Advair Diskus  -IV Solu-Medrol Q12H initiated; POC glucose with PRN insulin coverage scheduled  -Lovenox 30 SC BID initiated per current treatment guidelines    Chronic systolic CHF  -Patient admitted to telemetry floor for continuous monitoring during stay  -EKG obtained in ED reviewed personally and notable for low voltage QRS and nonspecific intraventricular conduction delay  -Continue home medication dosage of digoxin (level pending), Toprol-XL, Aldactone, and Demadex; strict I's and O's during stay  -ECHO scheduled in a.m. to further assess cardiac structure and function     Hypokalemia/hypomagnesemia  -1 g magnesium sulfate IV x1 and K. Dur 21 M EQ x1 ordered per admitting provider  -Hypokalemia/hypomagnesemia algorithm in place to ensure adequate repletion of electrolytes    Mild hyperglycemia  -A1c ordered with results pending at time of dictation  -Patient on no home hypoglycemic agents PTA  -PRN Humalog medium dose SSI scheduled before meals and at bedtime based on POC glucose  -Carbohydrate restriction placed on diet while he requires parenteral steroids      DVT prophylaxis-Lovenox 30 mg twice daily per COVID guidelines  Code status-full code  Diet-cardiac 2 g sodium with carb restriction while on parenteral steroids  IV access-PIV established in ED      Admit as inpatient. I anticipate hospitalization spanning more than two midnights for investigation and treatment of the above medically necessary diagnoses. Comment: Please note this report has been produced using speech recognition software and may contain errors related to that system including errors in grammar, punctuation, and spelling, as well as words and phrases that may be inappropriate. If there are any questions or concerns please feel free to contact the dictating provider for clarification.          West Moore MD    1/3/2023 8:02 PM

## 2023-01-05 ENCOUNTER — FOLLOWUP TELEPHONE ENCOUNTER (OUTPATIENT)
Dept: TELEMETRY | Age: 73
End: 2023-01-05

## 2023-01-05 PROBLEM — J12.82 PNEUMONIA DUE TO COVID-19 VIRUS: Status: ACTIVE | Noted: 2023-01-03

## 2023-01-05 LAB
ANION GAP SERPL CALCULATED.3IONS-SCNC: 6 MMOL/L (ref 3–16)
BASOPHILS ABSOLUTE: 0 K/UL (ref 0–0.2)
BASOPHILS RELATIVE PERCENT: 0.3 %
BUN BLDV-MCNC: 31 MG/DL (ref 7–20)
C-REACTIVE PROTEIN: 100.4 MG/L (ref 0–5.1)
CALCIUM SERPL-MCNC: 9.7 MG/DL (ref 8.3–10.6)
CHLORIDE BLD-SCNC: 95 MMOL/L (ref 99–110)
CO2: 36 MMOL/L (ref 21–32)
CREAT SERPL-MCNC: 0.8 MG/DL (ref 0.8–1.3)
CULTURE, RESPIRATORY: NORMAL
EOSINOPHILS ABSOLUTE: 0 K/UL (ref 0–0.6)
EOSINOPHILS RELATIVE PERCENT: 0 %
ESTIMATED AVERAGE GLUCOSE: 134.1 MG/DL
FERRITIN: 386.5 NG/ML (ref 30–400)
GFR SERPL CREATININE-BSD FRML MDRD: >60 ML/MIN/{1.73_M2}
GLUCOSE BLD-MCNC: 159 MG/DL (ref 70–99)
GRAM STAIN RESULT: NORMAL
HBA1C MFR BLD: 6.3 %
HCT VFR BLD CALC: 35.2 % (ref 40.5–52.5)
HEMOGLOBIN: 11.4 G/DL (ref 13.5–17.5)
LYMPHOCYTES ABSOLUTE: 0.5 K/UL (ref 1–5.1)
LYMPHOCYTES RELATIVE PERCENT: 3.2 %
MCH RBC QN AUTO: 28.7 PG (ref 26–34)
MCHC RBC AUTO-ENTMCNC: 32.4 G/DL (ref 31–36)
MCV RBC AUTO: 88.6 FL (ref 80–100)
MONOCYTES ABSOLUTE: 0.4 K/UL (ref 0–1.3)
MONOCYTES RELATIVE PERCENT: 2.7 %
NEUTROPHILS ABSOLUTE: 14.3 K/UL (ref 1.7–7.7)
NEUTROPHILS RELATIVE PERCENT: 93.8 %
PDW BLD-RTO: 15.3 % (ref 12.4–15.4)
PLATELET # BLD: 441 K/UL (ref 135–450)
PMV BLD AUTO: 7.8 FL (ref 5–10.5)
POTASSIUM REFLEX MAGNESIUM: 4.5 MMOL/L (ref 3.5–5.1)
RBC # BLD: 3.97 M/UL (ref 4.2–5.9)
SODIUM BLD-SCNC: 137 MMOL/L (ref 136–145)
WBC # BLD: 15.2 K/UL (ref 4–11)

## 2023-01-05 PROCEDURE — 82728 ASSAY OF FERRITIN: CPT

## 2023-01-05 PROCEDURE — 36415 COLL VENOUS BLD VENIPUNCTURE: CPT

## 2023-01-05 PROCEDURE — 99222 1ST HOSP IP/OBS MODERATE 55: CPT | Performed by: STUDENT IN AN ORGANIZED HEALTH CARE EDUCATION/TRAINING PROGRAM

## 2023-01-05 PROCEDURE — 94640 AIRWAY INHALATION TREATMENT: CPT

## 2023-01-05 PROCEDURE — 2700000000 HC OXYGEN THERAPY PER DAY

## 2023-01-05 PROCEDURE — 1200000000 HC SEMI PRIVATE

## 2023-01-05 PROCEDURE — 2580000003 HC RX 258: Performed by: HOSPITALIST

## 2023-01-05 PROCEDURE — 83036 HEMOGLOBIN GLYCOSYLATED A1C: CPT

## 2023-01-05 PROCEDURE — 80048 BASIC METABOLIC PNL TOTAL CA: CPT

## 2023-01-05 PROCEDURE — 6360000002 HC RX W HCPCS: Performed by: HOSPITALIST

## 2023-01-05 PROCEDURE — 6370000000 HC RX 637 (ALT 250 FOR IP): Performed by: HOSPITALIST

## 2023-01-05 PROCEDURE — 94761 N-INVAS EAR/PLS OXIMETRY MLT: CPT

## 2023-01-05 PROCEDURE — 86140 C-REACTIVE PROTEIN: CPT

## 2023-01-05 PROCEDURE — 85025 COMPLETE CBC W/AUTO DIFF WBC: CPT

## 2023-01-05 RX ORDER — METHYLPREDNISOLONE SODIUM SUCCINATE 40 MG/ML
40 INJECTION, POWDER, LYOPHILIZED, FOR SOLUTION INTRAMUSCULAR; INTRAVENOUS DAILY
Status: DISCONTINUED | OUTPATIENT
Start: 2023-01-06 | End: 2023-01-06

## 2023-01-05 RX ADMIN — ENOXAPARIN SODIUM 30 MG: 100 INJECTION SUBCUTANEOUS at 20:59

## 2023-01-05 RX ADMIN — Medication 2 PUFF: at 07:38

## 2023-01-05 RX ADMIN — METHYLPREDNISOLONE SODIUM SUCCINATE 40 MG: 40 INJECTION, POWDER, FOR SOLUTION INTRAMUSCULAR; INTRAVENOUS at 00:21

## 2023-01-05 RX ADMIN — Medication 10 ML: at 09:48

## 2023-01-05 RX ADMIN — ENOXAPARIN SODIUM 30 MG: 100 INJECTION SUBCUTANEOUS at 09:47

## 2023-01-05 RX ADMIN — ATORVASTATIN CALCIUM 40 MG: 40 TABLET, FILM COATED ORAL at 20:59

## 2023-01-05 RX ADMIN — METOPROLOL SUCCINATE 12.5 MG: 25 TABLET, EXTENDED RELEASE ORAL at 09:46

## 2023-01-05 RX ADMIN — TORSEMIDE 20 MG: 20 TABLET ORAL at 09:46

## 2023-01-05 RX ADMIN — TIOTROPIUM BROMIDE INHALATION SPRAY 1 PUFF: 3.12 SPRAY, METERED RESPIRATORY (INHALATION) at 07:38

## 2023-01-05 RX ADMIN — Medication 10 ML: at 21:00

## 2023-01-05 RX ADMIN — Medication 2 PUFF: at 21:02

## 2023-01-05 RX ADMIN — SPIRONOLACTONE 12.5 MG: 25 TABLET ORAL at 09:47

## 2023-01-05 RX ADMIN — DIGOXIN 125 MCG: 125 TABLET ORAL at 09:47

## 2023-01-05 RX ADMIN — METHYLPREDNISOLONE SODIUM SUCCINATE 40 MG: 40 INJECTION, POWDER, FOR SOLUTION INTRAMUSCULAR; INTRAVENOUS at 12:18

## 2023-01-05 ASSESSMENT — ENCOUNTER SYMPTOMS
ABDOMINAL DISTENTION: 0
TROUBLE SWALLOWING: 0
CONSTIPATION: 0
WHEEZING: 0
COLOR CHANGE: 0
EYE PAIN: 0
SORE THROAT: 0
EYE REDNESS: 0
STRIDOR: 0
COUGH: 0
DIARRHEA: 0
VOMITING: 0
EYE DISCHARGE: 0
SHORTNESS OF BREATH: 1
BACK PAIN: 0
EYE ITCHING: 0
ABDOMINAL PAIN: 0
NAUSEA: 0

## 2023-01-05 NOTE — PROGRESS NOTES
Comprehensive Nutrition Assessment    Type and Reason for Visit:  Initial    Nutrition Recommendations/Plan:   Modify diet to ERNESTINA and encoruage PO intake   FR per MD  RD to add ensure BID   Monitor nutrition adequacy, pertinent labs, bowel habits, wt changes, and clinical progress     Malnutrition Assessment:  Malnutrition Status:  Insufficient data (Pt in droplet plus precautions) (01/05/23 1302)    Context:  Acute Illness     Findings of the 6 clinical characteristics of malnutrition:  Energy Intake:  Mild decrease in energy intake (Comment)    Nutrition Assessment:    Low BMI: Pt admitted w/ COVID-19 with acute hypoxic respiratory failure. CHF, plan for echo. Pt in droplet plus precautions, pt did not answer phone. Spoke to [facility]. On cardiac diet. Intake % of meals recorded. RD to liberalize diet to ERNESTINA to promote PO intake. No wt hx to review in EMR. RD to add ensure w/ meals to provide optimal nutrition. Continue to encourage PO intake, will continue to monitor. Nutrition Related Findings:    + BM today. Labs reviewed. Wound Type: None       Current Nutrition Intake & Therapies:    Average Meal Intake: 51-75%, %  Average Supplements Intake: None Ordered  ADULT ORAL NUTRITION SUPPLEMENT; Breakfast, Lunch, Dinner; Standard High Calorie/High Protein Oral Supplement  ADULT DIET; Regular; No Added Salt (3-4 gm); 1800 ml    Anthropometric Measures:  Height: 5' 11\" (180.3 cm)  Ideal Body Weight (IBW): 172 lbs (78 kg)       Current Body Weight: 115 lb (52.2 kg), 66.9 % IBW.  Weight Source: Bed Scale  Current BMI (kg/m2): 16                    BMI Categories: Underweight (BMI less than 22) age over 72    Estimated Daily Nutrient Needs:  Energy Requirements Based On: Kcal/kg (20-25 kcals/kg)  Weight Used for Energy Requirements: Ideal (78 kg)  Energy (kcal/day): 5785-4886  Weight Used for Protein Requirements: Ideal (1-1.2 g/kg)  Protein (g/day): 78-94 g  Method Used for Fluid Requirements: Other (Comment)  Fluid (ml/day): Less than 2000 ml per CHF guidelines    Nutrition Diagnosis:   Inadequate oral intake related to inadequate protein-energy intake as evidenced by intake 51-75%, poor intake prior to admission, BMI    Nutrition Interventions:   Food and/or Nutrient Delivery: Modify Current Diet, Start Oral Nutrition Supplement  Nutrition Education/Counseling: Education not indicated  Coordination of Nutrition Care: Continue to monitor while inpatient       Goals:     Goals: PO intake 50% or greater, prior to discharge       Nutrition Monitoring and Evaluation:   Behavioral-Environmental Outcomes: None Identified  Food/Nutrient Intake Outcomes: Food and Nutrient Intake, Supplement Intake  Physical Signs/Symptoms Outcomes: Biochemical Data, Weight, Nutrition Focused Physical Findings    Discharge Planning:    Continue current diet     PhyllistLauryn Cardozo 87, [address] [place] [address],   Contact: Office: [phone]; [address]: [phone]

## 2023-01-05 NOTE — PROGRESS NOTES
Pulmonary New Consultation       Patient Name:  Fred Geiger ADMISSION/CC: COVID, respiratory failure    PCP: No primary care provider on file. HISTORY OF PRESENT ILLNESS:   81y.o. year old male with significant past medical history of tobacco dependence that presents with shortness of breath. Patient noted to have fevers and worsening oxygenation at his care facility. He says that he uses 3 to 4 L oxygen therapy at home with exertion and at times at rest.  He says that he could not get an accurate reading on his pulse oximeter after experiencing symptoms of shortness of breath and fevers. Patient was found to have a COVID-pneumonia. Pulmonary was consulted for respiratory failure. Patient used to smoke a tobacco pipe in the past, he quit 6 years ago. He denies any illicit drug use, no alcohol use. He used to work for [employer] in the past.  He denies any occupational exposures. He denies any household exposures. Past Medical History:   Diagnosis Date    Cerebral artery occlusion with cerebral infarction [facility])     COPD (chronic obstructive pulmonary disease) (HCC)     Hyperlipidemia        History reviewed. No pertinent surgical history. family history is not on file.     Social History     Tobacco Use    Smoking status: Never    Smokeless tobacco: Never   Substance Use Topics    Alcohol use: Not on file        Meds:    Current Facility-Administered Medications:     enoxaparin Sodium (LOVENOX) injection 30 mg, 30 mg, SubCUTAneous, BID, Elba Livingston MD, 30 mg at 01/04/23 2125    guaiFENesin-dextromethorphan (ROBITUSSIN DM) 100-10 MG/5ML syrup 5 mL, 5 mL, Oral, Q4H PRN, Elba Livingston MD, 5 mL at 01/04/23 0015    methylPREDNISolone sodium (SOLU-MEDROL) injection 40 mg, 40 mg, IntraVENous, Q12H, Elba Livingston MD, 40 mg at 01/05/23 0021    metoprolol succinate (TOPROL XL) extended release tablet 12.5 mg, 12.5 mg, Oral, Daily, Elba Livingston MD, 12.5 mg at 01/04/23 0945    pantoprazole (PROTONIX) tablet 40 mg, 40 mg, Oral, QAM AC, Anderson Mosquera MD, 40 mg at 01/04/23 7950    torsemide (DEMADEX) tablet 20 mg, 20 mg, Oral, Daily, Anderson Mosquera MD, 20 mg at 01/04/23 0944    spironolactone (ALDACTONE) tablet 12.5 mg, 12.5 mg, Oral, Daily, Anderson Mosquera MD, 12.5 mg at 01/04/23 0945    sodium chloride flush 0.9 % injection 10 mL, 10 mL, IntraVENous, 2 times per day, Anderson Mosquera MD, 10 mL at 01/04/23 2125    sodium chloride flush 0.9 % injection 10 mL, 10 mL, IntraVENous, PRN, Anderson Mosquera MD    0.9 % sodium chloride infusion, , IntraVENous, PRN, Anderson Mosquera MD    potassium chloride (KLOR-CON M) extended release tablet 40 mEq, 40 mEq, Oral, PRN **OR** potassium bicarb-citric acid (EFFER-K) effervescent tablet 40 mEq, 40 mEq, Oral, PRN **OR** potassium chloride 10 mEq/100 mL IVPB (Peripheral Line), 10 mEq, IntraVENous, PRN, Anderson Mosquera MD    magnesium sulfate 2000 mg in 50 mL IVPB premix, 2,000 mg, IntraVENous, PRN, Anderson Mosquera MD    senna (SENOKOT) tablet 8.6 mg, 1 tablet, Oral, Daily PRN, Anderson Mosquera MD    acetaminophen (TYLENOL) tablet 650 mg, 650 mg, Oral, Q6H PRN **OR** acetaminophen (TYLENOL) suppository 650 mg, 650 mg, Rectal, Q6H PRN, Anderson Mosquera MD    ondansetron (ZOFRAN-ODT) disintegrating tablet 4 mg, 4 mg, Oral, Q8H PRN **OR** ondansetron (ZOFRAN) injection 4 mg, 4 mg, IntraVENous, Q6H PRN, Anderson Mosquera MD    mometasone-formoterol (DULERA) 200-5 MCG/ACT inhaler 2 puff, 2 puff, Inhalation, BID, Anderson Mosquera MD, 2 puff at 01/05/23 0738    atorvastatin (LIPITOR) tablet 40 mg, 40 mg, Oral, Nightly, Anderson Mosquera MD, 40 mg at 01/04/23 2125    digoxin (LANOXIN) tablet 125 mcg, 125 mcg, Oral, Daily, Anderson Mosquera MD, 125 mcg at 01/04/23 0945    tiotropium (SPIRIVA RESPIMAT) 2.5 MCG/ACT inhaler 1 puff, 1 puff, Inhalation, QAM, Anderson Mosquera MD, 1 puff at 01/05/23 0738     Continuous Infusions:   sodium chloride         PRN Meds:  guaiFENesin-dextromethorphan, sodium chloride flush, sodium chloride, potassium chloride **OR** potassium alternative oral replacement **OR** potassium chloride, magnesium sulfate, senna, acetaminophen **OR** acetaminophen, ondansetron **OR** ondansetron    Allergies:  Patient has No Known Allergies. REVIEW OF SYSTEMS:  Review of Systems   Constitutional:  Negative for activity change, appetite change, chills, diaphoresis and fatigue. HENT:  Negative for congestion, sore throat and trouble swallowing. Eyes:  Negative for pain, discharge, redness and itching. Respiratory:  Positive for shortness of breath. Negative for cough, wheezing and stridor. Cardiovascular:  Negative for chest pain, palpitations and leg swelling. Gastrointestinal:  Negative for abdominal distention, abdominal pain, constipation, diarrhea, nausea and vomiting. Endocrine: Negative for polydipsia, polyphagia and polyuria. Genitourinary:  Negative for difficulty urinating. Musculoskeletal:  Negative for back pain, myalgias and neck pain. Skin:  Negative for color change. Neurological:  Negative for dizziness, weakness and light-headedness. Psychiatric/Behavioral:  Negative for agitation and behavioral problems. I personally reviewed the patient's medication list, medical and surgical history, and updated as needed. Objective:   EXAM:  /72   Pulse 64   Temp 97.7 °F (36.5 °C) (Oral)   Resp 16   Ht 5' 11\" (1.803 m)   Wt 115 lb 4.8 oz (52.3 kg)   SpO2 96%   BMI 16.08 kg/m²      Physical Exam  Constitutional:       General: He is not in acute distress. Appearance: He is not toxic-appearing. HENT:      Head: Normocephalic and atraumatic. Nose: Nose normal.      Mouth/Throat:      Pharynx: No oropharyngeal exudate. Eyes:      General: No scleral icterus. Right eye: No discharge. Left eye: No discharge. Cardiovascular:      Rate and Rhythm: Normal rate and regular rhythm. Heart sounds: No murmur heard. No friction rub. No gallop. Pulmonary:      Breath sounds: Rales present. No wheezing. Abdominal:      General: Abdomen is flat. Bowel sounds are normal.      Palpations: Abdomen is soft. Musculoskeletal:         General: Normal range of motion. Cervical back: Normal range of motion. Skin:     General: Skin is warm and dry. Neurological:      General: No focal deficit present. Mental Status: He is alert and oriented to person, place, and time. Psychiatric:         Mood and Affect: Mood normal.      Data Reviewed:   LABS:  :   Recent Labs     01/03/23  1019 01/04/23  0555 01/05/23  0616   WBC 10.7 9.6 15.2*   HGB 12.1* 11.5* 11.4*   HCT 36.7* 35.4* 35.2*   MCV 88.1 87.9 88.6    398 441     BMP:   Recent Labs     01/03/23  1019 01/04/23  0555 01/05/23  0616   * 135* 137   K 3.4* 4.1 4.5   CL 91* 94* 95*   CO2 32 31 36*   BUN 13 25* 31*   CREATININE 0.7* 0.7* 0.8     PROFILE:   Recent Labs     01/03/23  1019 01/04/23  0555   AST 36 36   ALT 30 31   BILITOT 0.5 0.3   ALKPHOS 189* 165*     PT/INR:   Recent Labs     01/03/23 2008   PROTIME 14.1   INR 1.10     APTT:  Recent Labs     01/03/23 2008   APTT 33.9     :  Recent Labs     01/04/23  1003   COLORU Yellow   PHUR 6.0   WBCUA 0-2   RBCUA 0-2   CLARITYU Clear   SPECGRAV 1.025   LEUKOCYTESUR Negative   UROBILINOGEN 1.0   BILIRUBINUR Negative   BLOODU Negative   GLUCOSEU Negative     No results for input(s): PHART, [id number], PO2ART in the last 72 hours. Chest x-ray 1/3/2023  Bilateral patchy opacities with right greater than left. Flattened diaphragms. Assessment/Plan   81y.o. year old male with significant past medical history of tobacco dependence that presents with shortness of breath. Assessment:  Acute on Chronic Hypoxic Respiratory Failure  COVID+  Prior Tobacco Dependence  COPD    Plan:  - cont supplemental O2 therapy with goal saturation 88-92%.  Pt was on oxygen requirements greater than baseline requirement.  He is currently on his home 3L NC.   - Decrease solumedrol to 40mg IV qd, cont for full 10 day course  - Agree with home Dulera, spiriva, albuterol PRN for SOB  - cont home HF medications    Mike Parada MD    9:31 AM

## 2023-01-05 NOTE — CARE COORDINATION
Chart reviewed, pt in droplet plus isolation for Covid, writer called pt's room with no answer. Kathy Marino CNP updated writer, pt alert and oriented, from [facility], has home O2, new consult for Pulm today, possible discharge tomorrow. CM will continue to follow pt's progress and coordinate discharge arrangements as appropriate. EVERETT Adams     0788 Addendum:  Writer spoke with pt's son Alyssia Castellanos, he will transport pt back to [facility] tomorrow with O2 tank. Alyssia Castellanos had some concerns about being seen by physician at [facility]. Writer spoke with nurse at [facility], pt is seen every 2-3weeks by Dr Kirby Segovia, and will see pt when he returns. Jaclyn aware pt is Covid+.   EVERETT Adams

## 2023-01-05 NOTE — PROGRESS NOTES
Hospitalist Progress Note      PCP: No primary care provider on file. Date of Admission: 1/3/2023    Chief Complaint: SOB    Hospital Course:   Camron Wright is a 81 y.o. male who presented to the ED to be evaluated for acute on chronic dyspnea, in the setting of underlying COPD. Patient reports that he is typically well managed on continuous 2 L O2 per NC, but yesterday began experiencing exertional dyspnea well beyond his baseline. Home pulse oximeter demonstrated SPO2 of 86%, which is what prompted his transport to the ED. Patient reports that he has an underlying history of COPD but no longer smokes tobacco.  He is unaware of any recent sick exposures, but does admit that he lives in an assisted living facility. Patient reports that he is fully vaccinated against SARS-CoV-2 and influenza. Upon arrival to the ED EKG was obtained revealing NSR with low limb lead voltage, and nonspecific intraventricular conduction delay. CXR notable for multifocal airspace opacities, most prevalent in RLL; consistent with COVID multifocal pneumonia. SARS-CoV-2 testing performed and found to be positive; influenza A/B-. Notable labs include: Hyponatremia 133, hypomagnesemia 1.6, hyperglycemia 133,  fibrinogen 998, , and anemia with H/H12.1/36.7. Patient received one-time dosage of IV Solu-Medrol 80 mg as well as DuoNeb therapy per ED provider prior to request for admission.     Subjective:  feels a little better this am. No events overnight      Medications:  Reviewed    Infusion Medications    sodium chloride       Scheduled Medications    enoxaparin  30 mg SubCUTAneous BID    methylPREDNISolone  40 mg IntraVENous Q12H    metoprolol succinate  12.5 mg Oral Daily    pantoprazole  40 mg Oral QAM AC    torsemide  20 mg Oral Daily    spironolactone  12.5 mg Oral Daily    sodium chloride flush  10 mL IntraVENous 2 times per day    mometasone-formoterol  2 puff Inhalation BID    atorvastatin  40 mg Oral Nightly    digoxin  125 mcg Oral Daily    tiotropium  1 puff Inhalation QAM     PRN Meds: guaiFENesin-dextromethorphan, sodium chloride flush, sodium chloride, potassium chloride **OR** potassium alternative oral replacement **OR** potassium chloride, magnesium sulfate, senna, acetaminophen **OR** acetaminophen, ondansetron **OR** ondansetron      Intake/Output Summary (Last 24 hours) at 1/5/2023 0630  Last data filed at 1/5/2023 0418  Gross per 24 hour   Intake 240 ml   Output 1550 ml   Net -1310 ml       Physical Exam Performed:    /72   Pulse 54   Temp 97.7 °F (36.5 °C) (Oral)   Resp 18   Ht 5' 11\" (1.803 m)   Wt 115 lb 4.8 oz (52.3 kg)   SpO2 96%   BMI 16.08 kg/m²     General appearance: frail. No apparent distress, appears stated age and cooperative. HEENT: Pupils equal, round, and reactive to light. Conjunctivae/corneas clear. Neck: Supple, with full range of motion. No jugular venous distention. Trachea midline. Respiratory:  Normal respiratory effort. Diminished bases. Cardiovascular: Regular rate and rhythm with normal S1/S2 without murmurs, rubs or gallops. Abdomen: Soft, non-tender, non-distended with normal bowel sounds. Musculoskeletal: No clubbing, cyanosis or edema bilaterally. Full range of motion without deformity. Skin: Skin color, texture, turgor normal.  No rashes or lesions. Neurologic:  Neurovascularly intact without any focal sensory/motor deficits.  Cranial nerves: II-XII intact, grossly non-focal.  Psychiatric: Alert and oriented, thought content appropriate, normal insight  Capillary Refill: Brisk, 3 seconds, normal   Peripheral Pulses: +2 palpable, equal bilaterally       Labs:   Recent Labs     01/03/23  1019 01/04/23  0555 01/05/23  0616   WBC 10.7 9.6 15.2*   HGB 12.1* 11.5* 11.4*   HCT 36.7* 35.4* 35.2*    398 441       Recent Labs     01/03/23  1019 01/04/23  0555   * 135*   K 3.4* 4.1   CL 91* 94*   CO2 32 31   BUN 13 25*   CREATININE 0.7* 0.7* CALCIUM 9.4 9.4       Recent Labs     01/03/23  1019 01/04/23  0555   AST 36 36   ALT 30 31   BILITOT 0.5 0.3   ALKPHOS 189* 165*       Recent Labs     01/03/23 2008   INR 1.10       Recent Labs     01/03/23 2008 01/04/23  0140 01/04/23  0555   TROPONINI <0.01 <0.01 <0.01         Urinalysis:      Lab Results   Component Value Date/Time    NITRU Negative 01/04/2023 10:03 AM    WBCUA 0-2 01/04/2023 10:03 AM    RBCUA 0-2 01/04/2023 10:03 AM    BLOODU Negative 01/04/2023 10:03 AM    SPECGRAV 1.025 01/04/2023 10:03 AM    GLUCOSEU Negative 01/04/2023 10:03 AM       Radiology:  XR CHEST PORTABLE   Final Result   Multifocal airspace opacities most prevalent in the right lower lobe and   small right pleural effusion. No prior exams are available for comparison. In the acute setting this could represent multifocal pneumonia. Recommend   follow-up x-rays until resolution.              None    Assessment/Plan:    Active Hospital Problems    Diagnosis     Acute hypoxemic respiratory failure due to COVID-19 ([facility]) [U07.1, J96.01]      Priority: Medium    COPD (chronic obstructive pulmonary disease) ([facility] [address] [J44.9]      Priority: Medium    Chronic systolic CHF (congestive heart failure) ([facility]) [I50.22]      Priority: Medium    HLD (hyperlipidemia) [E78.5]      Priority: Medium    History of CVA in adulthood [Z86.73]      Priority: Medium     COVID-19 with acute hypoxic respiratory failure  -Covid detected 1/3  -Admit to floor for continuous telemetry and SPO2 monitoring; droplet plus isolation precautions in place  -PCT 0.15, LA 3.4->2.1  -On 3lpm n/c  -Albuterol MDI scheduled every 4 hours as needed; continue home regimen Advair Diskus  -IV Solu-Medrol Q12H initiated; POC glucose with PRN insulin coverage scheduled  -Lovenox 30 SC BID initiated per current treatment guidelines  -Pulm consultation     Chronic systolic CHF  -Patient admitted to telemetry floor for continuous monitoring during stay  -EKG obtained in ED reviewed: low voltage QRS and nonspecific intraventricular conduction delay  -Continue home medication dosage of digoxin (level pending), Toprol-XL, Aldactone, and Demadex; strict I's and O's during stay  -ECHO scheduled in a.m. to further assess cardiac structure and function      Hypokalemia/hypomagnesemia  -Hypokalemia/hypomagnesemia algorithm in place to ensure adequate repletion of electrolytes     Mild hyperglycemia  -A1c pending  -Patient on no home hypoglycemic agents PTA  -PRN Humalog medium dose SSI scheduled before meals and at bedtime based on POC glucose  -Carbohydrate restriction placed on diet while he requires parenteral steroids     DVT Prophylaxis: Lovenox  Diet: ADULT ORAL NUTRITION SUPPLEMENT; Breakfast, Lunch, Dinner; Standard High Calorie/High Protein Oral Supplement  ADULT DIET; Regular;  No Added Salt (3-4 gm); 1800 ml  Code Status: Limited  PT/OT Eval Status: consulted    Dispo - 2 days    Appropriate for A1 Discharge Unit: No      ISRAEL Dyer - CNP

## 2023-01-06 VITALS
BODY MASS INDEX: 16.14 KG/M2 | HEIGHT: 71 IN | SYSTOLIC BLOOD PRESSURE: 136 MMHG | OXYGEN SATURATION: 93 % | WEIGHT: 115.3 LBS | RESPIRATION RATE: 16 BRPM | DIASTOLIC BLOOD PRESSURE: 78 MMHG | TEMPERATURE: 97.9 F | HEART RATE: 70 BPM

## 2023-01-06 LAB
ANION GAP SERPL CALCULATED.3IONS-SCNC: 7 MMOL/L (ref 3–16)
BASOPHILS ABSOLUTE: 0 K/UL (ref 0–0.2)
BASOPHILS RELATIVE PERCENT: 0.2 %
BLOOD CULTURE, ROUTINE: NORMAL
BUN BLDV-MCNC: 34 MG/DL (ref 7–20)
C-REACTIVE PROTEIN: 55.8 MG/L (ref 0–5.1)
CALCIUM SERPL-MCNC: 9.6 MG/DL (ref 8.3–10.6)
CHLORIDE BLD-SCNC: 95 MMOL/L (ref 99–110)
CO2: 37 MMOL/L (ref 21–32)
CREAT SERPL-MCNC: 0.7 MG/DL (ref 0.8–1.3)
EOSINOPHILS ABSOLUTE: 0.1 K/UL (ref 0–0.6)
EOSINOPHILS RELATIVE PERCENT: 0.6 %
GFR SERPL CREATININE-BSD FRML MDRD: >60 ML/MIN/{1.73_M2}
GLUCOSE BLD-MCNC: 110 MG/DL (ref 70–99)
HCT VFR BLD CALC: 37.1 % (ref 40.5–52.5)
HEMOGLOBIN: 11.7 G/DL (ref 13.5–17.5)
LYMPHOCYTES ABSOLUTE: 1.2 K/UL (ref 1–5.1)
LYMPHOCYTES RELATIVE PERCENT: 12.7 %
MCH RBC QN AUTO: 28.1 PG (ref 26–34)
MCHC RBC AUTO-ENTMCNC: 31.6 G/DL (ref 31–36)
MCV RBC AUTO: 88.9 FL (ref 80–100)
MONOCYTES ABSOLUTE: 0.8 K/UL (ref 0–1.3)
MONOCYTES RELATIVE PERCENT: 8.8 %
NEUTROPHILS ABSOLUTE: 7.3 K/UL (ref 1.7–7.7)
NEUTROPHILS RELATIVE PERCENT: 77.7 %
PDW BLD-RTO: 15.1 % (ref 12.4–15.4)
PLATELET # BLD: 436 K/UL (ref 135–450)
PMV BLD AUTO: 8.1 FL (ref 5–10.5)
POTASSIUM REFLEX MAGNESIUM: 4 MMOL/L (ref 3.5–5.1)
RBC # BLD: 4.18 M/UL (ref 4.2–5.9)
SODIUM BLD-SCNC: 139 MMOL/L (ref 136–145)
WBC # BLD: 9.3 K/UL (ref 4–11)

## 2023-01-06 PROCEDURE — 2580000003 HC RX 258: Performed by: HOSPITALIST

## 2023-01-06 PROCEDURE — 80048 BASIC METABOLIC PNL TOTAL CA: CPT

## 2023-01-06 PROCEDURE — 94640 AIRWAY INHALATION TREATMENT: CPT

## 2023-01-06 PROCEDURE — 36415 COLL VENOUS BLD VENIPUNCTURE: CPT

## 2023-01-06 PROCEDURE — 86140 C-REACTIVE PROTEIN: CPT

## 2023-01-06 PROCEDURE — 6360000002 HC RX W HCPCS: Performed by: HOSPITALIST

## 2023-01-06 PROCEDURE — 94761 N-INVAS EAR/PLS OXIMETRY MLT: CPT

## 2023-01-06 PROCEDURE — 85025 COMPLETE CBC W/AUTO DIFF WBC: CPT

## 2023-01-06 PROCEDURE — 6360000002 HC RX W HCPCS: Performed by: STUDENT IN AN ORGANIZED HEALTH CARE EDUCATION/TRAINING PROGRAM

## 2023-01-06 PROCEDURE — 2700000000 HC OXYGEN THERAPY PER DAY

## 2023-01-06 PROCEDURE — 6370000000 HC RX 637 (ALT 250 FOR IP): Performed by: HOSPITALIST

## 2023-01-06 PROCEDURE — 99232 SBSQ HOSP IP/OBS MODERATE 35: CPT | Performed by: STUDENT IN AN ORGANIZED HEALTH CARE EDUCATION/TRAINING PROGRAM

## 2023-01-06 RX ORDER — PREDNISONE 20 MG/1
40 TABLET ORAL DAILY
Status: DISCONTINUED | OUTPATIENT
Start: 2023-01-06 | End: 2023-01-06

## 2023-01-06 RX ORDER — PREDNISONE 20 MG/1
40 TABLET ORAL DAILY
Status: DISCONTINUED | OUTPATIENT
Start: 2023-01-07 | End: 2023-01-06 | Stop reason: HOSPADM

## 2023-01-06 RX ORDER — PREDNISONE 10 MG/1
40 TABLET ORAL DAILY
Qty: 24 TABLET | Refills: 0 | Status: SHIPPED | OUTPATIENT
Start: 2023-01-06 | End: 2023-01-12

## 2023-01-06 RX ADMIN — TIOTROPIUM BROMIDE INHALATION SPRAY 1 PUFF: 3.12 SPRAY, METERED RESPIRATORY (INHALATION) at 08:27

## 2023-01-06 RX ADMIN — SPIRONOLACTONE 12.5 MG: 25 TABLET ORAL at 09:18

## 2023-01-06 RX ADMIN — TORSEMIDE 20 MG: 20 TABLET ORAL at 09:18

## 2023-01-06 RX ADMIN — ENOXAPARIN SODIUM 30 MG: 100 INJECTION SUBCUTANEOUS at 09:18

## 2023-01-06 RX ADMIN — METOPROLOL SUCCINATE 12.5 MG: 25 TABLET, EXTENDED RELEASE ORAL at 09:18

## 2023-01-06 RX ADMIN — METHYLPREDNISOLONE SODIUM SUCCINATE 40 MG: 40 INJECTION, POWDER, FOR SOLUTION INTRAMUSCULAR; INTRAVENOUS at 09:18

## 2023-01-06 RX ADMIN — DIGOXIN 125 MCG: 125 TABLET ORAL at 09:18

## 2023-01-06 RX ADMIN — Medication 2 PUFF: at 08:27

## 2023-01-06 RX ADMIN — Medication 10 ML: at 09:19

## 2023-01-06 NOTE — CARE COORDINATION
CM received call from patient's son, Olivia Esteban, inquiring if patient was going to discharge to [facility] today. No discharge order at this time. However, per information at morning nursing and provider huddles, pt dispo was pending sign off by pulmonology team. Pulmonology sign off today. Cm messaged NP, , for discharge orders, if he is in agreement. CM notified [employer] staff, Darya Joe, of patient's pending return. 12:56 PM  Call back received from nurse, Paige Jimenez, at [facility]. CM updated her on likelihood of pt dc today and son providing transport.

## 2023-01-06 NOTE — DISCHARGE SUMMARY
Hospital Medicine Discharge Summary    Patient ID: Jenny Morales      Patient's PCP: No primary care provider on file. Admit Date: 1/3/2023     Discharge Date:   ***    Admitting Provider: Eugene Almonte MD     Discharge Provider: ISRAEL Gant - CNP     Discharge Diagnoses: Active Hospital Problems    Diagnosis     Pneumonia due to COVID-19 virus [U07.1, J12.82]      Priority: Medium    COPD (chronic obstructive pulmonary disease) ([facility]) [J44.9]      Priority: Medium    Chronic systolic CHF (congestive heart failure) ([facility]) [I50.22]      Priority: Medium    HLD (hyperlipidemia) [E78.5]      Priority: Medium    History of CVA in adulthood [Z86.73]      Priority: Medium       The patient was seen and examined on day of discharge and this discharge summary is in conjunction with any daily progress note from day of discharge. Hospital Course: ***          Physical Exam Performed:     /78   Pulse 70   Temp 97.9 °F (36.6 °C) (Oral)   Resp 16   Ht 5' 11\" (1.803 m)   Wt 115 lb 4.8 oz (52.3 kg)   SpO2 93%   BMI 16.08 kg/m²       General appearance:  No apparent distress, appears stated age and cooperative. HEENT:  Normal cephalic, atraumatic without obvious deformity. Pupils equal, round, and reactive to light. Extra ocular muscles intact. Conjunctivae/corneas clear. Neck: Supple, with full range of motion. No jugular venous distention. Trachea midline. Respiratory:  Normal respiratory effort. Clear to auscultation, bilaterally without Rales/Wheezes/Rhonchi. Cardiovascular:  Regular rate and rhythm with normal S1/S2 without murmurs, rubs or gallops. Abdomen: Soft, non-tender, non-distended with normal bowel sounds. Musculoskeletal:  No clubbing, cyanosis or edema bilaterally. Full range of motion without deformity. Skin: Skin color, texture, turgor normal.  No rashes or lesions. Neurologic:  Neurovascularly intact without any focal sensory/motor deficits.  Cranial nerves: II-XII intact, grossly non-focal.  Psychiatric:  Alert and oriented, thought content appropriate, normal insight  Capillary Refill: Brisk,< 3 seconds   Peripheral Pulses: +2 palpable, equal bilaterally       Labs: For convenience and continuity at follow-up the following most recent labs are provided:      CBC:    Lab Results   Component Value Date/Time    WBC 9.3 01/06/2023 06:18 AM    HGB 11.7 01/06/2023 06:18 AM    HCT 37.1 01/06/2023 06:18 AM     01/06/2023 06:18 AM       Renal:    Lab Results   Component Value Date/Time     01/06/2023 06:18 AM    K 4.0 01/06/2023 06:18 AM    CL 95 01/06/2023 06:18 AM    CO2 37 01/06/2023 06:18 AM    BUN 34 01/06/2023 06:18 AM    CREATININE 0.7 01/06/2023 06:18 AM    CALCIUM 9.6 01/06/2023 06:18 AM         Significant Diagnostic Studies    Radiology:   XR CHEST PORTABLE   Final Result   Multifocal airspace opacities most prevalent in the right lower lobe and   small right pleural effusion. No prior exams are available for comparison. In the acute setting this could represent multifocal pneumonia. Recommend   follow-up x-rays until resolution.                 Consults:     IP CONSULT TO PULMONOLOGY  IP CONSULT TO HOME CARE NEEDS    Disposition:  ***     Condition at Discharge: [address] [place] [address] Patient Condition:[id number]}    Discharge Instructions/Follow-up:  ***    Code Status:  Limited ***    Activity: activity as tolerated    Diet: {diet:93344}      Discharge Medications:     Current Discharge Medication List             Details   predniSONE (DELTASONE) 10 MG tablet Take 4 tablets by mouth daily for 6 days  Qty: 24 tablet, Refills: 0                Details   atorvastatin (LIPITOR) 80 MG tablet       digoxin (LANOXIN) 125 MCG tablet       ADVAIR DISKUS 500-50 MCG/ACT AEPB diskus inhaler       metoprolol succinate (TOPROL XL) 25 MG extended release tablet       omeprazole (PRILOSEC) 20 MG delayed release capsule       spironolactone (ALDACTONE) 25 MG tablet       torsemide (DEMADEX) 20 MG tablet       INCRUSE ELLIPTA 62.5 MCG/ACT AEPB              Time Spent on discharge: *** in the examination, evaluation, counseling and review of medications and discharge plan. Signed:    ISRAEL Arreaga CNP   1/6/2023      Thank you No primary care provider on file. for the opportunity to be involved in this patient's care. If you have any questions or concerns, please feel free to contact me at [phone].

## 2023-01-06 NOTE — PROGRESS NOTES
Spoke with Bee JOHNSON at [employer]. She said that the patient see Dr Otilio Ceballos and has a cardiologist appt in feb. Reviewed all meds with her.

## 2023-01-06 NOTE — CARE COORDINATION
Writer spoke with Willard Colin with [facility] for [place] [address].      Chau Calderon RN

## 2023-01-06 NOTE — CARE COORDINATION
[facility]    Referral for home care pending PCP to follow for orders    Called [place] 24-20-52-61 nursing station at [place]    Notes state seen by NP Joni Sweeney  at [facility] with patient and son they do not have contact information or know full name for this provider    Called nsg station at [place] awaiting call back to clarify PCP information    Patient states he doesn't have a pcp  Notified pt/cg there will be a delay in services pending a PCP to follow for home care    [facility] (house calls) to notify possible referral if np amado unable to sign as pcp    Orders faxed [facility], BSN CTN  [facility] [phone]

## 2023-01-06 NOTE — PROGRESS NOTES
Patient: Arvind Salgado MRN: [id number]  Date of  Admission: 1/3/2023   YOB: 1950  Age: 81 y.o. Sex: male    Unit: [place] [address] MED SURG/ORTHO  Room[facility]Bed: [facility] Admitting Physician: Soraya Smith    Attending Physician:  Cheikh Mcfarland MD         Pulmonary Service Note      SUBJECTIVE:    Patient with no complaints. He denies any shortness of breath, chest pain, nausea, vomiting, abdominal pain. OBJECTIVE    Medications    Continuous Infusions:   sodium chloride         Scheduled Meds:   methylPREDNISolone  40 mg IntraVENous Daily    enoxaparin  30 mg SubCUTAneous BID    metoprolol succinate  12.5 mg Oral Daily    pantoprazole  40 mg Oral QAM AC    torsemide  20 mg Oral Daily    spironolactone  12.5 mg Oral Daily    sodium chloride flush  10 mL IntraVENous 2 times per day    mometasone-formoterol  2 puff Inhalation BID    atorvastatin  40 mg Oral Nightly    digoxin  125 mcg Oral Daily    tiotropium  1 puff Inhalation QAM       PRN Meds:  guaiFENesin-dextromethorphan, sodium chloride flush, sodium chloride, potassium chloride **OR** potassium alternative oral replacement **OR** potassium chloride, magnesium sulfate, senna, acetaminophen **OR** acetaminophen, ondansetron **OR** ondansetron    Physical    Patient Vitals for the past 24 hrs:   BP Temp Temp src Pulse Resp SpO2   01/06/23 0917 134/70 97.8 °F (36.6 °C) Oral 64 16 94 %   01/06/23 0829 -- -- -- 72 -- 95 %   01/06/23 0827 -- -- -- -- -- 95 %   01/05/23 2335 137/74 98.7 °F (37.1 °C) Oral 65 16 97 %   01/05/23 1950 (!) 145/78 97.9 °F (36.6 °C) Oral 59 19 95 %   01/05/23 1142 122/75 97.8 °F (36.6 °C) Oral 62 18 96 %   01/05/23 0946 138/82 -- -- 62 -- --   01/05/23 0945 -- 97.8 °F (36.6 °C) Oral -- 18 --        Physical Exam  Constitutional:       General: He is not in acute distress. Appearance: He is not toxic-appearing. HENT:      Head: Normocephalic and atraumatic.       Nose: Nose normal.      Mouth/Throat:      Pharynx: No oropharyngeal exudate.   Eyes:      General: No scleral icterus.        Right eye: No discharge.         Left eye: No discharge.   Cardiovascular:      Rate and Rhythm: Normal rate and regular rhythm.      Heart sounds: No murmur heard.    No friction rub. No gallop.   Pulmonary:      Effort: No respiratory distress.      Breath sounds: Rales present. No wheezing.   Abdominal:      General: Abdomen is flat. Bowel sounds are normal.      Palpations: Abdomen is soft.   Musculoskeletal:         General: Normal range of motion.      Cervical back: Normal range of motion.   Skin:     General: Skin is warm and dry.   Neurological:      General: No focal deficit present.      Mental Status: He is alert and oriented to person, place, and time.   Psychiatric:         Mood and Affect: Mood normal.        Weight  Weight change:       Labs:   Recent Labs     01/04/23 0555 01/05/23 0616 01/06/23 0618   WBC 9.6 15.2* 9.3   HGB 11.5* 11.4* 11.7*   HCT 35.4* 35.2* 37.1*    441 436      Recent Labs     01/04/23 0555 01/05/23 0616 01/06/23 0618   * 137 139   K 4.1 4.5 4.0   CL 94* 95* 95*   CO2 31 36* 37*   BUN 25* 31* 34*   GLUCOSE 177* 159* 110*       Additional labs      Radiology, images personally reviewed.   Chest x-ray 1/3/2023  Bilateral patchy opacities with right greater than left.  Flattened diaphragms.    Assessment/Plan   72 y.o. year old male with significant past medical history of tobacco dependence that presents with shortness of breath.       Assessment:  Chronic Hypoxic Respiratory Failure  COVID+  Prior Tobacco Dependence  COPD     Plan:  - cont supplemental O2 therapy with goal saturation 88-92%. Pt currently on his home O2 requirement of 3L.   - Switched to Prednisone 40mg PO qd for full 10 day course  - Agree with home Dulera, spiriva, albuterol PRN for SOB  - cont home HF medications    Pulmonary will sign off please call with any questions or concerns.    MD Leatha Vinson  [facility]  [phone]    Please note that some or all of this record was generated using voice recognition software. If there are any questions about the content of this document, please contact the author as some errors in transcription may have occurred.

## 2023-01-06 NOTE — DISCHARGE INSTR - COC
Continuity of Care Form    Patient Name: Emily Granados   :  1950  MRN:  [id number]    Admit date:  1/3/2023  Discharge date:  ***    Code Status Order: Limited   Advance Directives:     Admitting Physician:  Leonard Davila MD  PCP: No primary care provider on file. Discharging Nurse: [facility] Unit/Room#: 1439/4042-23  Discharging Unit Phone Number: ***    Emergency Contact:   Extended Emergency Contact Information  Primary Emergency Contact:   [address]  Mobile Phone: [phone]  Relation: Child    Past Surgical History:  History reviewed. No pertinent surgical history. Immunization History: There is no immunization history on file for this patient.     Active Problems:  Patient Active Problem List   Diagnosis Code    Pneumonia due to COVID-19 virus U07.1, J12.82    COPD (chronic obstructive pulmonary disease) ([facility]) J44.9    Chronic systolic CHF (congestive heart failure) ([facility]) I50.22    HLD (hyperlipidemia) E78.5    History of CVA in adulthood Z86.73       Isolation/Infection:   Isolation            Droplet Plus  Droplet Plus          Patient Infection Status       Infection Onset Added Last Indicated Last Indicated By Review Planned Expiration Resolved Resolved By    COVID-19 23 COVID-19 & Influenza Combo 23      Resolved    COVID-19 (Rule Out) 23 COVID-19 & Influenza Combo (Ordered)   23 Rule-Out Test Resulted            Nurse Assessment:  Last Vital Signs: /78   Pulse 70   Temp 97.9 °F (36.6 °C) (Oral)   Resp 16   Ht 5' 11\" (1.803 m)   Wt 115 lb 4.8 oz (52.3 kg)   SpO2 93%   BMI 16.08 kg/m²     Last documented pain score (0-10 scale):    Last Weight:   Wt Readings from Last 1 Encounters:   23 115 lb 4.8 oz (52.3 kg)     Mental Status:  {IP PT MENTAL STATUS:}    IV Access:  { VINOD IV ACCESS:217896190}    Nursing Mobility/ADLs:  Walking   {[facility] DME MXBS:375485084}  Transfer  {[facility] DME MDVS:387852460}  Bathing  {[facility] DME ROUU:703693404}  Dressing  {[facility] DME KHRF:275367161}  Toileting  {[facility] DME TPTC:947706970}  Feeding  {[facility] DME JJVZ:392802467}  Med Admin  {[facility] DME UMNR:186293942}  Med Delivery   { VINOD MED Delivery:199433684}    Wound Care Documentation and Therapy:        Elimination:  Continence: Bowel: {YES / RM:38052}  Bladder: {YES / DB:03015}  Urinary Catheter: {Urinary Catheter:526253375}   Colostomy/Ileostomy/Ileal Conduit: {YES / KD:88412}       Date of Last BM: ***    Intake/Output Summary (Last 24 hours) at 2023  Last data filed at 2023  Gross per 24 hour   Intake 240 ml   Output 400 ml   Net -160 ml     I/O last 3 completed shifts:   In: 36 [P.O.:820]  Out:  [Urine:]    Safety Concerns:     [address] Concerns:760608624}    Impairments/Disabilities:      [address] [employer] VINOD Impairments/Disabilities:477818774}    Nutrition Therapy:  Current Nutrition Therapy:   [address] [employer] Diet List:208232481}    Routes of Feeding: {[facility] DME Other Feedings:986297255}  Liquids: {Slp liquid thickness:56574}  Daily Fluid Restriction: {[facility] DME Yes amt example:946433889}  Last Modified Barium Swallow with Video (Video Swallowing Test): {Done Not Done AIQO:656210155}    Treatments at the Time of Hospital Discharge:   Respiratory Treatments: ***  Oxygen Therapy:  {Therapy; copd oxygen:87824}  Ventilator:    { CC Vent JCJY:670326602}    Rehab Therapies: Physical Therapy, Occupational Therapy, and Nurse; HRS if patient agreeable  Weight Bearing Status/Restrictions: [employer]  Weight Bearin}  Other Medical Equipment (for information only, NOT a DME order):  {EQUIPMENT:622951074}  Other Treatments: HOME HEALTH CARE: LEVEL 1 STANDARD    Home health agency to establish plan of care for patient over 60 day period   Nursing  Initial home SN evaluation visit to occur within 24-48 hours for:  1)  medication management  2)  VS and clinical assessment  3)  S&S chronic disease exacerbation education + when to contact MD/NP  4)  care coordination  Medication Reconciliation during 1st SN visit    PT/OT   Evaluate with goal of regaining prior level of functioning   OT to evaluate if patient has [address] needs for personal care    PCP Visit scheduled within 7 days of hospital discharge   Telehealth-Homecare Vitals(If patient is agreeable and meets guidelines)      Patient's personal belongings (please select all that are sent with patient):  {CHP DME Belongings:348061792}    RN SIGNATURE:  {Esignature:537641129}    CASE MANAGEMENT/SOCIAL WORK SECTION    Inpatient Status Date: ***    Readmission Risk Assessment Score:  Readmission Risk              Risk of Unplanned Readmission:  15           Discharging to Facility/ Agency   Name:  [facility]    Address: [address] [place] [address]., [address] [place] [address]., [place] [address]  Phone: [phone]  Fax: [phone]      Dialysis Facility (if applicable)   Name:  Address:  Dialysis Schedule:  Phone:  Fax:    / signature: {Esignature:551271647}    PHYSICIAN SECTION    Prognosis: Fair    Condition at Discharge: Stable    Recommended Labs or Other Treatments After Discharge: Home care pending PCP to follow for orders; call placed to [address] [facility] [address] regarding William NP provider there; referral made to [facility] [facility] [address] if Chelo NP unable to sign as PCP    Physician Certification: I certify the above information and transfer of Linda Dears  is necessary for the continuing treatment of the diagnosis listed and that he requires Home care for less 30 days.      Update Admission H&P: No change in H&P    PHYSICIAN SIGNATURE:  Electronically signed by ISRAEL Solitario CNP on 1/6/23 at 3:22 PM EST

## 2023-01-06 NOTE — PROGRESS NOTES
Discharge instructions reviewed with pt. Reviewed all new meds and meds that are continued. Prednisone picked up from pharmacy and delivered to patient. Pt to follow up with pulmonologist in 1 week, number provided. All questions answered.

## 2023-01-06 NOTE — CARE COORDINATION
CASE MANAGEMENT DISCHARGE SUMMARY      [facility]charge to: [facility]    Precertification completed: [facility] Exemption Notification (HENS) completed: na    IMM given: (date) 1/3/23    New Durable Medical Equipment ordered/agency:     Transportation:       Medical Transport explained to [employer]. Pt/family voice no agency preference. Agency used: [facility]   time: 3428-2415 (pt did not want his family \"exposed to covid\".  Therefore asked for ambulance transport   Ambulance form completed: Yes    Confirmed discharge plan with:     Patient: yes     Family:  yes     RN, name: Fran Farley RN

## 2023-01-07 LAB — BLOOD CULTURE, ROUTINE: NORMAL

## 2023-01-08 LAB — CULTURE, BLOOD 2: NORMAL

## 2023-01-09 LAB — BLOOD CULTURE, ROUTINE: NORMAL

## 2023-01-13 ENCOUNTER — TELEPHONE (OUTPATIENT)
Dept: CARDIOLOGY CLINIC | Age: 73
End: 2023-01-13

## 2023-01-13 NOTE — TELEPHONE ENCOUNTER
Patient is scheduled to see [facility] this February. Please call patient to change this appointment to Dr. Mariaelena Pacheco for severe pulmonary hypertension and heart failure per [facility]. Can cancel appointment with [address]. Patient needs to see [facility] within 4-6 weeks.

## 2023-01-13 NOTE — TELEPHONE ENCOUNTER
first available is 3/28/23. Would  be able to get an approval for an overbook date? How should we proceed?

## 2023-01-13 NOTE — TELEPHONE ENCOUNTER
received a call from pt's NP at his primary care office today and informed [facility] of pt's cardiac condition. Per  he would like for the pt to see HARVEY and cancel the initial 2/7/23 [facility] new pt appt (already cancelled). Approved per PM Mehdi Powers, pt can see HARVEY at [facility] as a new patient on 1/24/23 at 1030a or 230pm please add per pm Weston Hilliard [facility] is held for rescheduling but can be used for this circumstance per Weston Hilliard). I left a vm on pt's cell [phone] informing pt to call back to schedule with HARVEY as a new pt for 1/24/23 at 1030a or 230pm approved per bela, and to confirm he received the message about the cancelled [facility] appt on 2/7/23.

## 2023-01-13 NOTE — TELEPHONE ENCOUNTER
GORGE Weiss would you be able to provide an overbook approval to see HARVEY as a new pt in 4-6 weeks per [facility] for pulm htn & heart failure? If not please speak with HARVEY for approval. First available with HARVEY at [facility] [place] is 3/28/23.

## 2023-01-13 NOTE — TELEPHONE ENCOUNTER
This chart is marked for merge. This is not the correct chart. Pt's chart should have the address shown of [place] [id number] [email or web address]. Furthermore, GORGE Rosado stated pt can be seen with gualberto on 1/24/23. Will contact pt and make new note in pt's correct chart.

## 2023-01-19 NOTE — PROGRESS NOTES
[facility]  Advanced CHF/Pulmonary Hypertension   Cardiac Evaluation      Nadira Qureshi  YOB: 1950    Date of Visit:  1/24/23      Chief Complaint   Patient presents with    New Patient    Congestive Heart Failure          History of Present Illness:  Nadira Qureshi is a 81 y.o. male who presents from referral from Dr. Berenice Oppenheim for consultation and management of pulmonary hypertension. He has a history of HLD, COPD, CHF, severe tricuspid regurgitation. He was admitted at [facility] 8/26/2022-9/238/2022 for SOB and confusion. Echo 8/2022 EF 30-35%. TOY 8/2022 EF 40%, PFO. CT Angiogram 9/2022 Moderate size pericardial effusion. Dilated right ventricle and right atrium, likely secondary to tricuspid valve regurgitation. PFO. Cath 9/2022 moderate to severe pulmonary hypertension. Presented to the hospital 1/3/2023 for shortness of breath, COVID pneumonia. Today, 1/24/2023, he presents on oxygen. He says he was recently in the hospital for COVID pneumonia. He was on oxygen prior to recent hospitalization. He lives at [facility]. He gets his medication brought to him by staff there. He uses an upper arm BP cuff at home. BP ranges 110-120's/70-80's. Patient is taking all cardiac medications as prescribed and tolerates them well. Patient denies current edema, chest pain, palpitations, dizziness or syncope. Aopparently he was started on sildenafil at [facility] for [facility]. However, his echo shows chronic systolic HF, so pulmonary hypertension more likely due to chronic systolic HF. Thus, he needs to be on better HF therapy, not PAH therapy.       No Known Allergies  Current Outpatient Medications   Medication Sig Dispense Refill    aspirin 81 MG chewable tablet Take 81 mg by mouth daily      ipratropium-albuterol (DUONEB) 0.5-2.5 (3) MG/3ML SOLN nebulizer solution Inhale 1 vial into the lungs in the morning, at noon, and at bedtime      guaiFENesin (MUCINEX) 600 MG extended release tablet Take 1,200 mg by mouth 2 times daily      albuterol sulfate HFA (PROVENTIL;VENTOLIN;PROAIR) 108 (90 Base) MCG/ACT inhaler       atorvastatin (LIPITOR) 80 MG tablet       digoxin (LANOXIN) 125 MCG tablet Take 125 mcg by mouth daily      ADVAIR DISKUS 500-50 MCG/ACT AEPB diskus inhaler       metoprolol succinate (TOPROL XL) 25 MG extended release tablet Take 12.5 mg by mouth daily      omeprazole (PRILOSEC) 20 MG delayed release capsule       spironolactone (ALDACTONE) 25 MG tablet Take 12.5 mg by mouth daily      torsemide (DEMADEX) 20 MG tablet Take 20 mg by mouth daily      INCRUSE ELLIPTA 62.5 MCG/ACT AEPB       sildenafil (REVATIO) 20 MG tablet Take 20 mg by mouth 3 times daily (Patient not taking: Reported on 1/24/2023)       No current facility-administered medications for this visit.       Past Medical History:   Diagnosis Date    Cerebral artery occlusion with cerebral infarction (HCC)     COPD (chronic obstructive pulmonary disease) (HCC)     Hyperlipidemia      No past surgical history on file.  No family history on file.  Social History     Socioeconomic History    Marital status:      Spouse name: Not on file    Number of children: Not on file    Years of education: Not on file    Highest education level: Not on file   Occupational History    Not on file   Tobacco Use    Smoking status: Never    Smokeless tobacco: Never   Substance and Sexual Activity    Alcohol use: Yes     Alcohol/week: 1.0 standard drink     Types: 1 Glasses of wine per week    Drug use: Not Currently    Sexual activity: Not on file   Other Topics Concern    Not on file   Social History Narrative    Not on file     Social Determinants of Health     Financial Resource Strain: Not on file   Food Insecurity: Not on file   Transportation Needs: Not on file   Physical Activity: Not on file   Stress: Not on file   Social Connections: Not on file   Intimate Partner Violence: Not on file   Housing  Stability: Not on file       Review of Systems:   Constitutional: there has been no unanticipated weight loss. There's been no change in energy level, sleep pattern, or activity level. Eyes: No visual changes or diplopia. No scleral icterus. ENT: No Headaches, hearing loss or vertigo. No mouth sores or sore throat. Cardiovascular: Reviewed in HPI  Respiratory: No cough or wheezing, no sputum production. No hematemesis. Gastrointestinal: No abdominal pain, appetite loss, blood in stools. No change in bowel or bladder habits. Genitourinary: No dysuria, trouble voiding, or hematuria. Musculoskeletal:  No gait disturbance, weakness or joint complaints. Integumentary: No rash or pruritis. Neurological: No headache, diplopia, change in muscle strength, numbness or tingling. No change in gait, balance, coordination, mood, affect, memory, mentation, behavior. Psychiatric: No anxiety, no depression. Endocrine: No malaise, fatigue or temperature intolerance. No excessive thirst, fluid intake, or urination. No tremor. Hematologic/Lymphatic: No abnormal bruising or bleeding, blood clots or swollen lymph nodes. Allergic/Immunologic: No nasal congestion or hives. Physical Examination:    Vitals:    01/24/23 1020   BP: 136/82   Pulse: (!) 101   SpO2: 94%   Weight: 141 lb (64 kg)   Height: 5' 11\" (1.803 m)     Body mass index is 19.67 kg/m². Wt Readings from Last 3 Encounters:   01/24/23 141 lb (64 kg)   01/03/23 115 lb 4.8 oz (52.3 kg)     BP Readings from Last 3 Encounters:   01/24/23 136/82   01/06/23 136/78     Constitutional and General Appearance:   Chronically ill appearing  HEENT:  NC/AT  KERRY  No problems with hearing  Skin:  Warm, dry  Respiratory:  Normal excursion and expansion without use of accessory muscles  Resp Auscultation: Normal breath sounds without dullness  Cardiovascular:   The apical impulses not displaced  Heart tones are crisp and normal  Cervical veins are not engorged  The carotid upstroke is normal in amplitude and contour without delay or bruit  JVP less than 8 cm H2O  RRR with nl S1 and S2 without m,r,g  Peripheral pulses are symmetrical and full  There is no clubbing, cyanosis of the extremities. No edema  Femoral Arteries: 2+ and equal  Pedal Pulses: 2+ and equal   Neck:  No thyromegaly  Abdomen:  No masses or tenderness  Liver/Spleen: No Abnormalities Noted  Neurological/Psychiatric:  Alert and oriented in all spheres  Moves all extremities well  Exhibits normal gait balance and coordination  No abnormalities of mood, affect, memory, mentation, or behavior are noted    Cardiac cath 9/15/2022 [facility]   1) Moderate to severe pulmonary HTN (precapillary) - not responsive to   Nitric Oxide   2) Normal LV filling pressure   3) Negative shunt run     Labs were reviewed including labs from other hospital systems through [facility]. Cardiac testing was reviewed including echos, nuclear scans, cardiac catheterization, including from other hospital systems through [facility]. Assessment:    1. Chronic systolic CHF (congestive heart failure) ([address]    2. Pneumonia due to COVID-19 virus    3. Hyperlipidemia, unspecified hyperlipidemia type    4. Pulmonary emphysema, unspecified emphysema type ([address]    5. Anemia, unspecified type    6. Essential hypertension, benign    7. PFO (patent foramen ovale)         Echocardiogram 8/29/2022 [facility]  Summary:   Small (<1 cm) pericardial effusion. Moderate pleural effusion is present. Right ventricle is moderately dilated. Left ventricular function is moderately reduced. Overall left ventricular ejection fraction is estimated to be 30-35%. Moderate global hypokinesis of the left ventricle. Right ventricular systolic pressure is mildly elevated at 35-45 mmHg. Moderate tricuspid regurgitation is present. Paradoxical septal motion is consistent with right ventricular volume   overload.    Right atrium is moderate to severely dilated.   Injection of agitated saline documented an interatrial shunt.     TOY 8/31/2022  Summary:   This was a difficult study.   No left atrial clot detected.   Right ventricle is severely dilated.   Right ventricular systolic function is severely reduced.   Right atrium is severely dilated.   Severe tricuspid regurgitation is present.   Central TR likely functional   Left ventricle was not well visualized, however LV function appears lat leas   moderately reduced with an ejection fraction very grossly estimated at 40%.   A patent foramen ovale is suspected.   Injection of agitated saline documented an interatrial shunt.   Bubble study was of poor quality given IV access issues   NO clear evidence of endocarditis. Reason for Study: Embolic CVA, IA shunt on   TTE.     CT Angio 9/1/2022 [facility]   IMPRESSION   Moderate size pericardial effusion.   Dilated right ventricle and right atrium, likely secondary to tricuspid valve regurgitation.   Patent foramen ovale.     Cardiac Cath 9/15/2022 [facility]  Findings:   Right heart cath:   RA mean 7 mmHg   RV 58/1   Wedge mean 8 mmHg (V wave 11)   PA 60/22 (35)   Cardiac output 5.8 lpm (Kirill)   Cardiac index 3.3   Pulmonary vascular resistance 4.7 Wood units   Systemic vascular resistance 953 D/s     1) Moderate to severe pulmonary HTN (precapillary) - not responsive to   Nitric Oxide   2) Normal LV filling pressure   3) Negative shunt run       Plan:  Start valsartan 80mg daily to improve heart function  Increase metoprolol succinate (Toprol XL) to 25 mg daily help improve heart function and lower heart rate.  This likely needs to be increased further in the future  Labs in 2 weeks: Iron and TIBC, ferritin, CBC, CMP, BNP  4. Stop sildenafil.  He needs better treatment for HFrEF. Not PAH  5. Follow up with Diane Enzweiler, NP in 3 months for further adjustment of systolic HF meds.    QUALITY MEASURES  1. Tobacco Cessation Counseling: NA  2. Retake of BP if  >140/90:   NA  3. Documentation to PCP/referring for new patient:  Sent to PCP at close of office visit  4. CAD patient on anti-platelet: Yes  5. CAD patient on STATIN therapy:  Yes  6. Patient with CHF and aFib on anticoagulation:  NA     This note was scribed in the presence of Marcelino Crawley MD by Hussein Shannon RN    The scribe's documentation has been prepared under my direction and personally reviewed by me in its entirety. I confirm that the note above accurately reflects all work, treatment, procedures, and medical decision making performed by me. Time Based Itemization  A total of 80 minutes was spent on today's patient encounter. If applicable, non-patient-facing activities:  ( x)Preparing to see the patient and reviewing records  ( ) Individual interpretation of results  ( ) Discussion or coordination of care with other health care professionals  ( x) Ordering of unique tests, medications, or procedures  ( x) Documentation within the EHR        I appreciate the opportunity of cooperating in the care of this patient.     Elida Caban M.D., [facility]

## 2023-01-24 ENCOUNTER — TELEPHONE (OUTPATIENT)
Dept: CARDIOLOGY CLINIC | Age: 73
End: 2023-01-24

## 2023-01-24 ENCOUNTER — OFFICE VISIT (OUTPATIENT)
Dept: CARDIOLOGY CLINIC | Age: 73
End: 2023-01-24
Payer: MEDICARE

## 2023-01-24 VITALS
DIASTOLIC BLOOD PRESSURE: 82 MMHG | BODY MASS INDEX: 19.74 KG/M2 | SYSTOLIC BLOOD PRESSURE: 136 MMHG | OXYGEN SATURATION: 94 % | HEIGHT: 71 IN | WEIGHT: 141 LBS | HEART RATE: 101 BPM

## 2023-01-24 DIAGNOSIS — D64.9 ANEMIA, UNSPECIFIED TYPE: ICD-10-CM

## 2023-01-24 DIAGNOSIS — I10 ESSENTIAL HYPERTENSION, BENIGN: ICD-10-CM

## 2023-01-24 DIAGNOSIS — J12.82 PNEUMONIA DUE TO COVID-19 VIRUS: ICD-10-CM

## 2023-01-24 DIAGNOSIS — E78.5 HYPERLIPIDEMIA, UNSPECIFIED HYPERLIPIDEMIA TYPE: ICD-10-CM

## 2023-01-24 DIAGNOSIS — I50.22 CHRONIC SYSTOLIC CHF (CONGESTIVE HEART FAILURE) (HCC): Primary | ICD-10-CM

## 2023-01-24 DIAGNOSIS — U07.1 PNEUMONIA DUE TO COVID-19 VIRUS: ICD-10-CM

## 2023-01-24 DIAGNOSIS — J43.9 PULMONARY EMPHYSEMA, UNSPECIFIED EMPHYSEMA TYPE (HCC): ICD-10-CM

## 2023-01-24 DIAGNOSIS — Q21.12 PFO (PATENT FORAMEN OVALE): ICD-10-CM

## 2023-01-24 PROCEDURE — 3075F SYST BP GE 130 - 139MM HG: CPT | Performed by: INTERNAL MEDICINE

## 2023-01-24 PROCEDURE — G8484 FLU IMMUNIZE NO ADMIN: HCPCS | Performed by: INTERNAL MEDICINE

## 2023-01-24 PROCEDURE — 99205 OFFICE O/P NEW HI 60 MIN: CPT | Performed by: INTERNAL MEDICINE

## 2023-01-24 PROCEDURE — G8420 CALC BMI NORM PARAMETERS: HCPCS | Performed by: INTERNAL MEDICINE

## 2023-01-24 PROCEDURE — 3079F DIAST BP 80-89 MM HG: CPT | Performed by: INTERNAL MEDICINE

## 2023-01-24 PROCEDURE — 3023F SPIROM DOC REV: CPT | Performed by: INTERNAL MEDICINE

## 2023-01-24 PROCEDURE — G8427 DOCREV CUR MEDS BY ELIG CLIN: HCPCS | Performed by: INTERNAL MEDICINE

## 2023-01-24 RX ORDER — ALBUTEROL SULFATE 90 UG/1
AEROSOL, METERED RESPIRATORY (INHALATION)
COMMUNITY
Start: 2023-01-13

## 2023-01-24 RX ORDER — GUAIFENESIN 600 MG/1
1200 TABLET, EXTENDED RELEASE ORAL 2 TIMES DAILY
COMMUNITY

## 2023-01-24 RX ORDER — METOPROLOL SUCCINATE 25 MG/1
25 TABLET, EXTENDED RELEASE ORAL DAILY
Qty: 90 TABLET | Refills: 3 | Status: SHIPPED | OUTPATIENT
Start: 2023-01-24

## 2023-01-24 RX ORDER — SILDENAFIL CITRATE 20 MG/1
20 TABLET ORAL 3 TIMES DAILY
COMMUNITY
Start: 2022-09-23

## 2023-01-24 RX ORDER — VALSARTAN 80 MG/1
80 TABLET ORAL DAILY
Qty: 90 TABLET | Refills: 3 | Status: SHIPPED | OUTPATIENT
Start: 2023-01-24

## 2023-01-24 RX ORDER — ASPIRIN 81 MG/1
81 TABLET, CHEWABLE ORAL DAILY
COMMUNITY
Start: 2022-09-23

## 2023-01-24 RX ORDER — IPRATROPIUM BROMIDE AND ALBUTEROL SULFATE 2.5; .5 MG/3ML; MG/3ML
1 SOLUTION RESPIRATORY (INHALATION) 3 TIMES DAILY
COMMUNITY

## 2023-01-24 NOTE — PATIENT INSTRUCTIONS
Plan:  Start valsartan 80mg daily to improve heart function  Increase metoprolol succinate (Toprol XL) to 25 mg daily help improve heart function and lower heart rate  Labs in 2 weeks: Iron and TIBC, ferritin, CBC, CMP, BNP  4. Stop sildenafil  5.  Follow up with Daisy Mckeon NP in 3 months

## 2023-01-30 ENCOUNTER — TELEPHONE (OUTPATIENT)
Dept: PULMONOLOGY | Age: 73
End: 2023-01-30

## 2023-01-30 NOTE — TELEPHONE ENCOUNTER
Per Dr Jair Mathias, pt need to change appt on 1/31/23 from 1:20 pm to 3:20 pm , because a bronch he need to do at the hospital.  Call pt and and relate the need of changing his appt time, he state need to verified with his transportation and will call back.

## 2023-01-31 ENCOUNTER — OFFICE VISIT (OUTPATIENT)
Dept: PULMONOLOGY | Age: 73
End: 2023-01-31
Payer: MEDICARE

## 2023-01-31 VITALS
HEIGHT: 71 IN | OXYGEN SATURATION: 98 % | WEIGHT: 141 LBS | DIASTOLIC BLOOD PRESSURE: 76 MMHG | BODY MASS INDEX: 19.74 KG/M2 | RESPIRATION RATE: 18 BRPM | SYSTOLIC BLOOD PRESSURE: 126 MMHG | TEMPERATURE: 98.9 F | HEART RATE: 102 BPM

## 2023-01-31 DIAGNOSIS — J96.01 ACUTE RESPIRATORY FAILURE WITH HYPOXIA (HCC): Primary | ICD-10-CM

## 2023-01-31 DIAGNOSIS — U07.1 COVID: ICD-10-CM

## 2023-01-31 DIAGNOSIS — J44.9 CHRONIC OBSTRUCTIVE PULMONARY DISEASE, UNSPECIFIED COPD TYPE (HCC): ICD-10-CM

## 2023-01-31 PROCEDURE — 1036F TOBACCO NON-USER: CPT | Performed by: STUDENT IN AN ORGANIZED HEALTH CARE EDUCATION/TRAINING PROGRAM

## 2023-01-31 PROCEDURE — G8420 CALC BMI NORM PARAMETERS: HCPCS | Performed by: STUDENT IN AN ORGANIZED HEALTH CARE EDUCATION/TRAINING PROGRAM

## 2023-01-31 PROCEDURE — 99204 OFFICE O/P NEW MOD 45 MIN: CPT | Performed by: STUDENT IN AN ORGANIZED HEALTH CARE EDUCATION/TRAINING PROGRAM

## 2023-01-31 PROCEDURE — G8484 FLU IMMUNIZE NO ADMIN: HCPCS | Performed by: STUDENT IN AN ORGANIZED HEALTH CARE EDUCATION/TRAINING PROGRAM

## 2023-01-31 PROCEDURE — 1111F DSCHRG MED/CURRENT MED MERGE: CPT | Performed by: STUDENT IN AN ORGANIZED HEALTH CARE EDUCATION/TRAINING PROGRAM

## 2023-01-31 PROCEDURE — G8427 DOCREV CUR MEDS BY ELIG CLIN: HCPCS | Performed by: STUDENT IN AN ORGANIZED HEALTH CARE EDUCATION/TRAINING PROGRAM

## 2023-01-31 PROCEDURE — 1123F ACP DISCUSS/DSCN MKR DOCD: CPT | Performed by: STUDENT IN AN ORGANIZED HEALTH CARE EDUCATION/TRAINING PROGRAM

## 2023-01-31 PROCEDURE — 3017F COLORECTAL CA SCREEN DOC REV: CPT | Performed by: STUDENT IN AN ORGANIZED HEALTH CARE EDUCATION/TRAINING PROGRAM

## 2023-01-31 PROCEDURE — 3023F SPIROM DOC REV: CPT | Performed by: STUDENT IN AN ORGANIZED HEALTH CARE EDUCATION/TRAINING PROGRAM

## 2023-01-31 ASSESSMENT — ENCOUNTER SYMPTOMS
TROUBLE SWALLOWING: 0
SORE THROAT: 0
SHORTNESS OF BREATH: 0
ABDOMINAL DISTENTION: 0
DIARRHEA: 0
ABDOMINAL PAIN: 0
COLOR CHANGE: 0
EYE ITCHING: 0
VOMITING: 0
EYE DISCHARGE: 0
BACK PAIN: 0
NAUSEA: 0
STRIDOR: 0
EYE PAIN: 0
WHEEZING: 0
EYE REDNESS: 0
CONSTIPATION: 0
COUGH: 0

## 2023-01-31 NOTE — PATIENT INSTRUCTIONS
MA Communication:   The following orders are received by verbal communication from Chika Orlando MD    Orders include: CT WITHOUT CONTRAST 08/03/2023  Arrival 10:30am   PFT 08/03/2023   Arrival 11:30am     Follow up same day with Dr. Muna Valle 08/03/2023  appt time 2:20pm

## 2023-01-31 NOTE — PROGRESS NOTES
[facility] Pulmonary New Clinic Visit   [address], [place] [id number] [phone]    Chief Complaint/Referring Provider:  Patient is being seen at the request of Dr. Granda for a consultation for COVID.    Presenting HPI:   72 y.o. year old male with significant past medical history of tobacco dependence that presents to [facility] for shortness of breath.  Patient was admitted for COVID-pneumonia 1 month ago.  He had fevers and worsening oxygenation at his care facility he was admitted to [facility].  He uses 3 to 4 L oxygen therapy at home with exertion and at times at rest.  Today, patient reports that his symptoms are much improved.  He denies any shortness of breath at rest or with exertion.  He denies any cough with productive sputum, chest pain, nausea, vomiting, abdominal pain.  He uses triple therapy at home and nebulizer treatment as needed.     Patient used to smoke a tobacco pipe in the past, he quit 6 years ago.  He denies any illicit drug use, no alcohol use.  He used to work for [employer] in the past.  He denies any occupational exposures.  He denies any household exposures.       Past Medical History:   Diagnosis Date    Cerebral artery occlusion with cerebral infarction (HCC)     COPD (chronic obstructive pulmonary disease) (HCC)     Hyperlipidemia        No past surgical history on file.    No Known Allergies    Medication listwas reviewed and updated as needed in Epic    Social History     Socioeconomic History    Marital status:      Spouse name: Not on file    Number of children: Not on file    Years of education: Not on file    Highest education level: Not on file   Occupational History    Not on file   Tobacco Use    Smoking status: Never    Smokeless tobacco: Never   Substance and Sexual Activity    Alcohol use: Yes     Alcohol/week: 1.0 standard drink     Types: 1 Glasses of wine per week    Drug use: Not Currently    Sexual activity: Not  on file   Other Topics Concern    Not on file   Social History Narrative    Not on file     Social Determinants of Health     Financial Resource Strain: Not on file   Food Insecurity: Not on file   Transportation Needs: Not on file   Physical Activity: Not on file   Stress: Not on file   Social Connections: Not on file   Intimate Partner Violence: Not on file   Housing Stability: Not on file       No family history on file. Review of Systems: CompleteReview of system reviewed with patient and noted on attached review of system sheet. Review of Systems   Constitutional:  Negative for activity change, appetite change, chills, diaphoresis and fatigue. HENT:  Negative for congestion, sore throat and trouble swallowing. Eyes:  Negative for pain, discharge, redness and itching. Respiratory:  Negative for cough, shortness of breath, wheezing and stridor. Cardiovascular:  Negative for chest pain, palpitations and leg swelling. Gastrointestinal:  Negative for abdominal distention, abdominal pain, constipation, diarrhea, nausea and vomiting. Endocrine: Negative for polydipsia, polyphagia and polyuria. Genitourinary:  Negative for difficulty urinating. Musculoskeletal:  Negative for back pain, myalgias and neck pain. Skin:  Negative for color change. Neurological:  Negative for dizziness, weakness and light-headedness. Psychiatric/Behavioral:  Negative for agitation and behavioral problems. Physical Exam:  Vitals:    01/31/23 1525   BP: 126/76   Pulse: (!) 102   Resp: 18   Temp: 98.9 °F (37.2 °C)   SpO2: 98%        Physical Exam  Constitutional:       General: He is not in acute distress. Appearance: He is not toxic-appearing. HENT:      Head: Normocephalic and atraumatic. Nose: Nose normal.      Mouth/Throat:      Pharynx: No oropharyngeal exudate. Eyes:      General: No scleral icterus. Right eye: No discharge. Left eye: No discharge.    Cardiovascular: Rate and Rhythm: Normal rate and regular rhythm. Heart sounds: No murmur heard. No friction rub. No gallop. Pulmonary:      Effort: Pulmonary effort is normal. No respiratory distress. Breath sounds: No wheezing or rales. Abdominal:      General: Abdomen is flat. Bowel sounds are normal.      Palpations: Abdomen is soft. Musculoskeletal:         General: Normal range of motion. Cervical back: Normal range of motion. Skin:     General: Skin is warm and dry. Neurological:      General: No focal deficit present. Mental Status: He is alert and oriented to person, place, and time. Psychiatric:         Mood and Affect: Mood normal.        Data:     Imaging    CT Chest:   9/6/22  IMPRESSION       Bilateral lung consolidation primarily in the left lung most likely representing necrotizing pneumonia. Tapered appearance of the left main bronchus without any obvious mass favored represent secretions or mucous plugging and less likely central neoplasm. Follow-up CT imaging after resolution of acute symptoms or bronchoscopy is recommended. Advanced destructive centrilobular emphysema. Bilateral pleural effusions with evidence of loculation on the left. CXR:   Chest x-ray 1/3/2023  Bilateral patchy opacities with right greater than left. Flattened diaphragms. ECHO:   8/31/22  Summary: This was a difficult study. No left atrial clot detected. Right ventricle is severely dilated. Right ventricular systolic function is severely reduced. Right atrium is severely dilated. Severe tricuspid regurgitation is present. Central TR likely functional   Left ventricle was not well visualized, however LV function appears lat leas   moderately reduced with an ejection fraction very grossly estimated at 40%. A patent foramen ovale is suspected. Injection of agitated saline documented an interatrial shunt.    Bubble study was of poor quality given IV access issues   NO clear evidence of endocarditis. Reason for Study: Embolic CVA, IA shunt on   TTE. PFT: None    Assessment:  Encounter Diagnoses   Name Primary? Acute respiratory failure with hypoxia (HCC) Yes    COVID     Chronic obstructive pulmonary disease, unspecified COPD type ([facility] [address]      Plan:   - Cont supplemental O2 therapy with goal saturation 88-92%. Advised patient to only use as needed. He reports O2 saturation > 90% on RA at rest  - cont triple therapy - Advair, Incruse Ellipta, Albuterol PRN for SOB   - PFT ordered  - CT chest w/o con ordered - abnormal imaging in 2022 and COVID PNA, will assess lung parenchyma.      Guille Cevallos MD  [facility] Pulmonary Critical Care

## 2023-02-02 LAB
ALBUMIN SERPL-MCNC: 4.5 G/DL
ALP BLD-CCNC: 96 U/L
ALT SERPL-CCNC: 20 U/L
ANION GAP SERPL CALCULATED.3IONS-SCNC: NORMAL MMOL/L
AST SERPL-CCNC: 24 U/L
BASOPHILS ABSOLUTE: ABNORMAL
BASOPHILS RELATIVE PERCENT: ABNORMAL
BILIRUB SERPL-MCNC: 0.4 MG/DL (ref 0.1–1.4)
BUN BLDV-MCNC: 22 MG/DL
CALCIUM SERPL-MCNC: 9.31 MG/DL
CHLORIDE BLD-SCNC: 98 MMOL/L
CO2: 29 MMOL/L
CREAT SERPL-MCNC: 0.88 MG/DL
EOSINOPHILS ABSOLUTE: ABNORMAL
EOSINOPHILS RELATIVE PERCENT: ABNORMAL
GFR SERPL CREATININE-BSD FRML MDRD: 90.89 ML/MIN/{1.73_M2}
GLUCOSE BLD-MCNC: 68 MG/DL
HCT VFR BLD CALC: 42.5 % (ref 41–53)
HEMOGLOBIN: 12.5 G/DL (ref 13.5–17.5)
IRON: 69
LYMPHOCYTES ABSOLUTE: ABNORMAL
LYMPHOCYTES RELATIVE PERCENT: ABNORMAL
MCH RBC QN AUTO: 28.8 PG
MCHC RBC AUTO-ENTMCNC: 29.4 G/DL
MCV RBC AUTO: 97.9 FL
MONOCYTES ABSOLUTE: ABNORMAL
MONOCYTES RELATIVE PERCENT: ABNORMAL
NEUTROPHILS ABSOLUTE: ABNORMAL
NEUTROPHILS RELATIVE PERCENT: ABNORMAL
PLATELET # BLD: 337 K/ΜL
PMV BLD AUTO: ABNORMAL FL
POTASSIUM SERPL-SCNC: 4.6 MMOL/L
RBC # BLD: 4.34 10^6/ΜL
SODIUM BLD-SCNC: 142 MMOL/L
TOTAL IRON BINDING CAPACITY: 312
TOTAL PROTEIN: 6.3
WBC # BLD: 8.34 10^3/ML

## 2023-02-10 ENCOUNTER — TELEPHONE (OUTPATIENT)
Dept: CARDIOLOGY CLINIC | Age: 73
End: 2023-02-10

## 2023-02-10 NOTE — TELEPHONE ENCOUNTER
----- Message from Parag Cordero MD sent at 2/9/2023 10:47 PM EST -----  Please call patient and let him know that his labs look good. No changes.   HARVEY

## 2023-02-10 NOTE — TELEPHONE ENCOUNTER
Created telephone encounter. Spoke with Francisco Javier Orantes relayed message per HARVEY regarding labs. Pt verbalized understanding. Pt says BP in the afternoon BP has been falling. He says BP today after lunch 91/61 pulse 94. Yesterday he felt like he did not get enough sleep. He wants to know if any meds need adjusted?or if when he had medications adjusted last time if that can make his BP lower?

## 2023-02-10 NOTE — TELEPHONE ENCOUNTER
It is possible that our med changes makes blood pressure lower. Or something else could be doing on. But labs look good. He can try cutting the valsartan in half dosing once a day. To see if that helps. Or he can move the valsartan to bedtime.   HARVEY

## 2023-08-03 ENCOUNTER — HOSPITAL ENCOUNTER (OUTPATIENT)
Dept: CT IMAGING | Age: 73
Discharge: HOME | End: 2023-08-03
Payer: MEDICARE

## 2023-08-03 ENCOUNTER — HOSPITAL ENCOUNTER (OUTPATIENT)
Dept: PULMONOLOGY | Age: 73
Discharge: HOME | End: 2023-08-03
Payer: MEDICARE

## 2023-08-03 VITALS — OXYGEN SATURATION: 92 %

## 2023-08-03 DIAGNOSIS — J96.01 ACUTE RESPIRATORY FAILURE WITH HYPOXIA (HCC): ICD-10-CM

## 2023-08-03 DIAGNOSIS — U07.1 COVID: ICD-10-CM

## 2023-08-03 LAB
DLCO %PRED: 33 %
DLCO PRED: NORMAL
DLCO/VA %PRED: NORMAL
DLCO/VA PRED: NORMAL
DLCO/VA: NORMAL
DLCO: NORMAL
EXPIRATORY TIME-POST: NORMAL
EXPIRATORY TIME: NORMAL
FEF 25-75% %CHNG: NORMAL
FEF 25-75% %PRED-POST: NORMAL
FEF 25-75% %PRED-PRE: NORMAL
FEF 25-75% PRED: NORMAL
FEF 25-75%-POST: NORMAL
FEF 25-75%-PRE: NORMAL
FEV1 %PRED-POST: 24 %
FEV1 %PRED-PRE: 23 %
FEV1 PRED: NORMAL
FEV1-POST: NORMAL
FEV1-PRE: NORMAL
FEV1/FVC %PRED-POST: 31 %
FEV1/FVC %PRED-PRE: 32 %
FEV1/FVC PRED: NORMAL
FEV1/FVC-POST: NORMAL
FEV1/FVC-PRE: NORMAL
FVC %PRED-POST: 78 L
FVC %PRED-PRE: 72 %
FVC PRED: NORMAL
FVC-POST: NORMAL
FVC-PRE: NORMAL
GAW %PRED: NORMAL
GAW PRED: NORMAL
GAW: NORMAL
IC %PRED: NORMAL
IC PRED: NORMAL
IC: NORMAL
MEP: NORMAL
MIP: NORMAL
MVV %PRED-PRE: NORMAL
MVV PRED: NORMAL
MVV-PRE: NORMAL
PEF %PRED-POST: NORMAL
PEF %PRED-PRE: NORMAL
PEF PRED: NORMAL
PEF%CHNG: NORMAL
PEF-POST: NORMAL
PEF-PRE: NORMAL
RAW %PRED: NORMAL
RAW PRED: NORMAL
RAW: NORMAL
RV %PRED: NORMAL
RV PRED: NORMAL
RV: NORMAL
SVC %PRED: NORMAL
SVC PRED: NORMAL
SVC: NORMAL
TLC %PRED: 67 %
TLC PRED: NORMAL
TLC: NORMAL
VA %PRED: NORMAL
VA PRED: NORMAL
VA: NORMAL
VTG %PRED: NORMAL
VTG PRED: NORMAL
VTG: NORMAL

## 2023-08-03 PROCEDURE — 71250 CT THORAX DX C-: CPT

## 2023-08-03 PROCEDURE — 6370000000 HC RX 637 (ALT 250 FOR IP): Performed by: STUDENT IN AN ORGANIZED HEALTH CARE EDUCATION/TRAINING PROGRAM

## 2023-08-03 PROCEDURE — 94060 EVALUATION OF WHEEZING: CPT

## 2023-08-03 PROCEDURE — 94729 DIFFUSING CAPACITY: CPT

## 2023-08-03 PROCEDURE — 94726 PLETHYSMOGRAPHY LUNG VOLUMES: CPT

## 2023-08-03 PROCEDURE — 94760 N-INVAS EAR/PLS OXIMETRY 1: CPT

## 2023-08-03 RX ORDER — ALBUTEROL SULFATE 90 UG/1
2 AEROSOL, METERED RESPIRATORY (INHALATION) ONCE
Status: COMPLETED | OUTPATIENT
Start: 2023-08-03 | End: 2023-08-03

## 2023-08-03 RX ADMIN — Medication 2 PUFF: at 11:07

## 2023-08-03 ASSESSMENT — PULMONARY FUNCTION TESTS
FVC_PERCENT_PREDICTED_POST: 78
FEV1_PERCENT_PREDICTED_POST: 24
FEV1/FVC_PERCENT_PREDICTED_POST: 31
FEV1_PERCENT_PREDICTED_PRE: 23
FVC_PERCENT_PREDICTED_PRE: 72
FEV1/FVC_PERCENT_PREDICTED_PRE: 32

## 2023-08-04 NOTE — PROCEDURES
Pulmonary Function Testing      Patient name:  Brody Mohr     [facility] Unit #:   [id number]   Date of test:  8/3/2023   Date of interpretation:   8/4/2023    Mr. Brody Mohr is a 81y.o. year-old  The spirometry data were acceptable and reproducible. Spirometry:  Flow volume loops were obstructed. The FEV-1/FVC ratio was decreased. The FEV-1 was 0.77 liters (23% of predicted), which was severely decreased. The FVC was 3.15 liters (72% of predicted), which was normal. Response to inhaled bronchodilators (albuterol) was not significant. Lung volumes:  Lung volumes were tested by plethysmography. The total lung capacity was 4.90 liters (67% of predicted), which was decreased. The residual volume was 2.40 liters (94% of predicted), which was normal.     Diffusion capacity was found to be 48% which is severely reduced. Interpretation:  Full PFT done spirometry shows a severe obstructive lung defect with no bronchodilator effect. Lung volumes show a moderate restrictive lung defect with no air trapping or hyperinflation. Diffusion is severely reduced.   Flow volume loops are consistent with this diagnosis    Comments:

## 2023-08-07 ENCOUNTER — OFFICE VISIT (OUTPATIENT)
Dept: PULMONOLOGY | Age: 73
End: 2023-08-07
Payer: MEDICARE

## 2023-08-07 VITALS
SYSTOLIC BLOOD PRESSURE: 134 MMHG | DIASTOLIC BLOOD PRESSURE: 87 MMHG | RESPIRATION RATE: 18 BRPM | WEIGHT: 159 LBS | TEMPERATURE: 97.6 F | HEIGHT: 71 IN | BODY MASS INDEX: 22.26 KG/M2 | OXYGEN SATURATION: 95 % | HEART RATE: 97 BPM

## 2023-08-07 DIAGNOSIS — R59.0 PARATRACHEAL LYMPHADENOPATHY: ICD-10-CM

## 2023-08-07 DIAGNOSIS — Z87.891 FORMER SMOKER: ICD-10-CM

## 2023-08-07 DIAGNOSIS — J96.11 CHRONIC RESPIRATORY FAILURE WITH HYPOXIA (HCC): Primary | ICD-10-CM

## 2023-08-07 DIAGNOSIS — J43.1 PANLOBULAR EMPHYSEMA (HCC): ICD-10-CM

## 2023-08-07 DIAGNOSIS — J44.9 COPD, VERY SEVERE (HCC): ICD-10-CM

## 2023-08-07 PROCEDURE — 3023F SPIROM DOC REV: CPT | Performed by: STUDENT IN AN ORGANIZED HEALTH CARE EDUCATION/TRAINING PROGRAM

## 2023-08-07 PROCEDURE — 1036F TOBACCO NON-USER: CPT | Performed by: STUDENT IN AN ORGANIZED HEALTH CARE EDUCATION/TRAINING PROGRAM

## 2023-08-07 PROCEDURE — 90677 PCV20 VACCINE IM: CPT | Performed by: STUDENT IN AN ORGANIZED HEALTH CARE EDUCATION/TRAINING PROGRAM

## 2023-08-07 PROCEDURE — 3017F COLORECTAL CA SCREEN DOC REV: CPT | Performed by: STUDENT IN AN ORGANIZED HEALTH CARE EDUCATION/TRAINING PROGRAM

## 2023-08-07 PROCEDURE — G8427 DOCREV CUR MEDS BY ELIG CLIN: HCPCS | Performed by: STUDENT IN AN ORGANIZED HEALTH CARE EDUCATION/TRAINING PROGRAM

## 2023-08-07 PROCEDURE — 1123F ACP DISCUSS/DSCN MKR DOCD: CPT | Performed by: STUDENT IN AN ORGANIZED HEALTH CARE EDUCATION/TRAINING PROGRAM

## 2023-08-07 PROCEDURE — G0009 ADMIN PNEUMOCOCCAL VACCINE: HCPCS | Performed by: STUDENT IN AN ORGANIZED HEALTH CARE EDUCATION/TRAINING PROGRAM

## 2023-08-07 PROCEDURE — G8420 CALC BMI NORM PARAMETERS: HCPCS | Performed by: STUDENT IN AN ORGANIZED HEALTH CARE EDUCATION/TRAINING PROGRAM

## 2023-08-07 PROCEDURE — 99213 OFFICE O/P EST LOW 20 MIN: CPT | Performed by: STUDENT IN AN ORGANIZED HEALTH CARE EDUCATION/TRAINING PROGRAM

## 2023-08-07 ASSESSMENT — ENCOUNTER SYMPTOMS
SORE THROAT: 0
ABDOMINAL PAIN: 0
COUGH: 0
EYE DISCHARGE: 0
ABDOMINAL DISTENTION: 0
COLOR CHANGE: 0
DIARRHEA: 0
NAUSEA: 0
EYE PAIN: 0
WHEEZING: 0
SHORTNESS OF BREATH: 0
BACK PAIN: 0
EYE ITCHING: 0
STRIDOR: 0
TROUBLE SWALLOWING: 0
VOMITING: 0
EYE REDNESS: 0
CONSTIPATION: 0

## 2023-08-07 NOTE — PROGRESS NOTES
[facility] Follow-up  1 [facility]        Hitesh Rios (: 1950 ) is a 81 y.o. male here for an evaluation of   Chief Complaint   Patient presents with    Results     Pft and chest CT with out results 8/3         SUBJECTIVE/OBJECTIVE:    81y.o. year old male with significant past medical history of tobacco dependence that presents to [facility] clinic for follow-up visit. Patient reports having an exacerbation of his obstructive lung disease recently. He was on antibiotics and steroids with improvement in his symptoms. Today, he has no complaints. He denies any fever, chills, cough, shortness of breath, nausea, vomiting, abdominal pain. He is here for results of pulmonary function test and CT imaging. Patient used to smoke a tobacco pipe in the past, he quit 6 years ago. He denies any illicit drug use, no alcohol use. He used to work for [employer] in the past.  He denies any occupational exposures. He denies any household exposures. Review of Systems   Constitutional:  Negative for activity change, appetite change, chills, diaphoresis and fatigue. HENT:  Negative for congestion, sore throat and trouble swallowing. Eyes:  Negative for pain, discharge, redness and itching. Respiratory:  Negative for cough, shortness of breath, wheezing and stridor. Cardiovascular:  Negative for chest pain, palpitations and leg swelling. Gastrointestinal:  Negative for abdominal distention, abdominal pain, constipation, diarrhea, nausea and vomiting. Endocrine: Negative for polydipsia, polyphagia and polyuria. Genitourinary:  Negative for difficulty urinating. Musculoskeletal:  Negative for back pain, myalgias and neck pain. Skin:  Negative for color change. Neurological:  Negative for dizziness, weakness and light-headedness. Psychiatric/Behavioral:  Negative for agitation and behavioral problems.         Vitals:

## 2023-08-07 NOTE — PROGRESS NOTES
MA Communication:   The following orders are received by verbal communication from   Jaziel Velasco MD    Orders include:  Prev. 20        6 mo FU       Pul Rehab

## 2023-08-07 NOTE — PATIENT INSTRUCTIONS
Remember to bring a list of pulmonary medications and any CPAP or BiPAP machines to your next appointment with the office. Please keep all of your future appointments scheduled by [facility]. Out of respect for other patients and providers, you may be asked to reschedule your appointment if you arrive later than your scheduled appointment time. Appointments cancelled less than 24hrs in advance will be considered a no show. Patients with three missed appointments within 1 year or four missed appointments within 2 years can be dismissed from the practice. Please be aware that our physicians are required to work in the Intensive Care Unit at [facility].  Your appointment may need to be rescheduled if they are designated to work during your appointment time. You may receive a survey regarding the care you received during your visit. Your input is valuable to us. We encourage you to complete and return your survey. We hope you will choose us in the future for your healthcare needs. Pt instructed of all future appointment dates & times, including radiology, labs, procedures & referrals. If procedures were scheduled preparation instructions provided. Instructions on future appointments with [facility] were given.

## 2023-10-17 ENCOUNTER — TELEPHONE (OUTPATIENT)
Dept: PULMONOLOGY | Age: 73
End: 2023-10-17

## 2023-10-17 NOTE — TELEPHONE ENCOUNTER
Nurse Pract. Called and said she is treating pt for COPD excerbation with Prednisone and antibiotic she used augementin in July and it worked well do you have one that you perfer more.

## 2024-01-04 ENCOUNTER — TELEPHONE (OUTPATIENT)
Dept: PULMONOLOGY | Age: 74
End: 2024-01-04

## 2024-01-04 NOTE — TELEPHONE ENCOUNTER
Patient did not show for 4 mo f/u  with Madelin on 1/4/24.    Reason: unknown    This is patient's first no show.  Patient was a no show on: 1.4.24.      Patient did not reschedule.  Reschedule date:  n/a    Left  for pt to call back and reschedule.